# Patient Record
Sex: FEMALE | Race: WHITE | NOT HISPANIC OR LATINO | Employment: FULL TIME | URBAN - METROPOLITAN AREA
[De-identification: names, ages, dates, MRNs, and addresses within clinical notes are randomized per-mention and may not be internally consistent; named-entity substitution may affect disease eponyms.]

---

## 2018-02-05 ENCOUNTER — HOSPITAL ENCOUNTER (OUTPATIENT)
Dept: CT IMAGING | Facility: CLINIC | Age: 58
Discharge: HOME | End: 2018-02-05
Attending: FAMILY MEDICINE | Admitting: FAMILY MEDICINE
Payer: COMMERCIAL

## 2018-02-05 DIAGNOSIS — Z13.6 SCREENING FOR HEART DISEASE: ICD-10-CM

## 2018-02-05 PROCEDURE — 75571 CT HRT W/O DYE W/CA TEST: CPT | Mod: 26 | Performed by: INTERNAL MEDICINE

## 2018-02-05 PROCEDURE — 75571 CT HRT W/O DYE W/CA TEST: CPT

## 2018-02-09 ENCOUNTER — OFFICE VISIT (OUTPATIENT)
Dept: FAMILY MEDICINE | Facility: CLINIC | Age: 58
End: 2018-02-09
Payer: COMMERCIAL

## 2018-02-09 VITALS
DIASTOLIC BLOOD PRESSURE: 84 MMHG | SYSTOLIC BLOOD PRESSURE: 123 MMHG | HEART RATE: 89 BPM | BODY MASS INDEX: 27 KG/M2 | RESPIRATION RATE: 18 BRPM | TEMPERATURE: 96.7 F | HEIGHT: 66 IN | WEIGHT: 168 LBS

## 2018-02-09 DIAGNOSIS — Z00.00 ROUTINE GENERAL MEDICAL EXAMINATION AT A HEALTH CARE FACILITY: Primary | ICD-10-CM

## 2018-02-09 DIAGNOSIS — F40.243 FEAR OF FLYING: ICD-10-CM

## 2018-02-09 DIAGNOSIS — K64.4 EXTERNAL HEMORRHOIDS: ICD-10-CM

## 2018-02-09 LAB
CHOLEST SERPL-MCNC: 245 MG/DL
GLUCOSE SERPL-MCNC: 99 MG/DL (ref 70–99)
HDLC SERPL-MCNC: 122 MG/DL
LDLC SERPL CALC-MCNC: 112 MG/DL
NONHDLC SERPL-MCNC: 123 MG/DL
TRIGL SERPL-MCNC: 57 MG/DL

## 2018-02-09 PROCEDURE — 82947 ASSAY GLUCOSE BLOOD QUANT: CPT | Performed by: FAMILY MEDICINE

## 2018-02-09 PROCEDURE — G0123 SCREEN CERV/VAG THIN LAYER: HCPCS | Performed by: FAMILY MEDICINE

## 2018-02-09 PROCEDURE — 36415 COLL VENOUS BLD VENIPUNCTURE: CPT | Performed by: FAMILY MEDICINE

## 2018-02-09 PROCEDURE — 99386 PREV VISIT NEW AGE 40-64: CPT | Performed by: FAMILY MEDICINE

## 2018-02-09 PROCEDURE — 87624 HPV HI-RISK TYP POOLED RSLT: CPT | Performed by: FAMILY MEDICINE

## 2018-02-09 PROCEDURE — 80061 LIPID PANEL: CPT | Performed by: FAMILY MEDICINE

## 2018-02-09 RX ORDER — ALPRAZOLAM 0.25 MG
0.25 TABLET ORAL 3 TIMES DAILY PRN
Qty: 12 TABLET | Refills: 1 | Status: SHIPPED | OUTPATIENT
Start: 2018-02-09 | End: 2018-03-09

## 2018-02-09 ASSESSMENT — PAIN SCALES - GENERAL: PAINLEVEL: NO PAIN (0)

## 2018-02-09 NOTE — LETTER
[facility]  [address]  [facility] [address]  Phone: [phone]      2/12/2018     Priya Ariella  [address]  [facility] [address]      Dear Priya:    Thank you for allowing me to participate in your care. Your recent test results were reviewed and listed below.      Your cholesterol looks good.  Total number is elevated only because the HDL or good cholesterol is so high.  LDL or bad cholesterol is acceptable at 112.  Triglycerides are very  normal.     Blood sugar is normal at 99.     Your results are provided below for your review  Results for orders placed or performed in visit on 02/09/18   GLUCOSE   Result Value Ref Range    Glucose 99 70 - 99 mg/dL   Lipid panel reflex to direct LDL Fasting   Result Value Ref Range    Cholesterol 245 (H) <200 mg/dL    Triglycerides 57 <150 mg/dL    HDL Cholesterol 122 >49 mg/dL    LDL Cholesterol Calculated 112 (H) <100 mg/dL    Non HDL Cholesterol 123 <130 mg/dL                 Thank you for choosing [facility]. As a result, please continue with the treatment plan discussed in the office. Return as discussed or sooner if symptoms worsen or fail to improve. If you have any further questions or concerns, please do not hesitate to contact us.      Sincerely,        Dr. Manisha Han/[facility]

## 2018-02-09 NOTE — PATIENT INSTRUCTIONS
Thank you for choosing [facility].  You may be receiving a survey in the mail from Lorene Hunter regarding your visit today.  Please take a few minutes to complete and return the survey to let us know how we are doing.      Our Clinic hours are:  Mondays    7:20 am - 7 pm  Tues -  Fri  7:20 am - 5 pm    Clinic Phone: [phone]    The clinic lab opens at 7:30 am Mon - Fri and appointments are required.    [facility]. [phone]  Monday-Thursday 8 am - 7pm  Tues/Wed/Fri 8 am - 5:30 pm           Preventive Health Recommendations  Female Ages 50 - 64    Yearly exam: See your health care provider every year in order to  o Review health changes.   o Discuss preventive care.    o Review your medicines if your doctor has prescribed any.      Get a Pap test every three years (unless you have an abnormal result and your provider advises testing more often).    If you get Pap tests with HPV test, you only need to test every 5 years, unless you have an abnormal result.     You do not need a Pap test if your uterus was removed (hysterectomy) and you have not had cancer.    You should be tested each year for STDs (sexually transmitted diseases) if you're at risk.     Have a mammogram every 1 to 2 years.    Have a colonoscopy at age 50, or have a yearly FIT test (stool test). These exams screen for colon cancer.      Have a cholesterol test every 5 years, or more often if advised.    Have a diabetes test (fasting glucose) every three years. If you are at risk for diabetes, you should have this test more often.     If you are at risk for osteoporosis (brittle bone disease), think about having a bone density scan (DEXA).    Shots: Get a flu shot each year. Get a tetanus shot every 10 years.    Nutrition:     Eat at least 5 servings of fruits and vegetables each day.    Eat whole-grain bread, whole-wheat pasta and brown rice instead of white grains and rice.    Talk to your provider about  Calcium and Vitamin D.     Lifestyle    Exercise at least 150 minutes a week (30 minutes a day, 5 days a week). This will help you control your weight and prevent disease.    Limit alcohol to one drink per day.    No smoking.     Wear sunscreen to prevent skin cancer.     See your dentist every six months for an exam and cleaning.    See your eye doctor every 1 to 2 years.

## 2018-02-09 NOTE — LETTER
February 19, 2018    Priyasergio Krishnan  [address] [facility] [address]    Dear Priya,  We are happy to inform you that your PAP smear result from 2/9/18 is normal.  We are now able to do a follow up test on PAP smears. The DNA test is for HPV (Human Papilloma Virus). Cervical cancer is closely linked with certain types of HPV. Your result showed no evidence of high risk HPV.  Therefore we recommend you return in 5 years for your next pap smear and HPV test.  You will still need to return to the clinic every year for an annual exam and other preventive tests.  Please contact the clinic at [phone] with any questions.  Sincerely,    Manisha Han MD/mary

## 2018-02-09 NOTE — NURSING NOTE
"Chief Complaint   Patient presents with     Physical       Initial /84  Pulse 89  Temp 96.7  F (35.9  C) (Tympanic)  Resp 18  Ht 5' 6\" (1.676 m)  Wt 168 lb (76.2 kg)  Breastfeeding? No  BMI 27.12 kg/m2 Estimated body mass index is 27.12 kg/(m^2) as calculated from the following:    Height as of this encounter: 5' 6\" (1.676 m).    Weight as of this encounter: 168 lb (76.2 kg).  Medication Reconciliation: complete    "

## 2018-02-09 NOTE — Clinical Note
Please abstract the following data from this visit with this patient into the appropriate field in Epic:  Colonoscopy done on this date: 02- (approximately), by this group: [facility], results were normal.

## 2018-02-09 NOTE — MR AVS SNAPSHOT
After Visit Summary   2/9/2018    Priya Krishnan    MRN: [id number]           Patient Information     Date Of Birth          1960        Visit Information        Provider Department      2/9/2018 8:40 AM Manisha Han MD [facility]        Today's Diagnoses     Routine general medical examination at a health care facility    -  1      Care Instructions          Thank you for choosing [facility].  You may be receiving a survey in the mail from [employer] regarding your visit today.  Please take a few minutes to complete and return the survey to let us know how we are doing.      Our Clinic hours are:  Mondays    7:20 am - 7 pm  Tues -  Fri  7:20 am - 5 pm    Clinic Phone: [phone]    The clinic lab opens at 7:30 am Mon - Fri and appointments are required.    [facility]  Ph. [phone]  Monday-Thursday 8 am - 7pm  Tues/Wed/Fri 8 am - 5:30 pm           Preventive Health Recommendations  Female Ages 50 - 64    Yearly exam: See your health care provider every year in order to  o Review health changes.   o Discuss preventive care.    o Review your medicines if your doctor has prescribed any.      Get a Pap test every three years (unless you have an abnormal result and your provider advises testing more often).    If you get Pap tests with HPV test, you only need to test every 5 years, unless you have an abnormal result.     You do not need a Pap test if your uterus was removed (hysterectomy) and you have not had cancer.    You should be tested each year for STDs (sexually transmitted diseases) if you're at risk.     Have a mammogram every 1 to 2 years.    Have a colonoscopy at age 50, or have a yearly FIT test (stool test). These exams screen for colon cancer.      Have a cholesterol test every 5 years, or more often if advised.    Have a diabetes test (fasting glucose) every three years. If you are at risk for diabetes, you should have this test more  "often.     If you are at risk for osteoporosis (brittle bone disease), think about having a bone density scan (DEXA).    Shots: Get a flu shot each year. Get a tetanus shot every 10 years.    Nutrition:     Eat at least 5 servings of fruits and vegetables each day.    Eat whole-grain bread, whole-wheat pasta and brown rice instead of white grains and rice.    Talk to your provider about Calcium and Vitamin D.     Lifestyle    Exercise at least 150 minutes a week (30 minutes a day, 5 days a week). This will help you control your weight and prevent disease.    Limit alcohol to one drink per day.    No smoking.     Wear sunscreen to prevent skin cancer.     See your dentist every six months for an exam and cleaning.    See your eye doctor every 1 to 2 years.            Follow-ups after your visit        Who to contact     If you have questions or need follow up information about today's clinic visit or your schedule please contact [facility] directly at [phone].  Normal or non-critical lab and imaging results will be communicated to you by [employer], letter or phone within 4 business days after the clinic has received the results. If you do not hear from us within 7 days, please contact the clinic through [employer] or phone. If you have a critical or abnormal lab result, we will notify you by phone as soon as possible.  Submit refill requests through [employer]yl[employer] or call your pharmacy and they will forward the refill request to us. Please allow 3 business days for your refill to be completed.          Additional Information About Your Visit        doggyloot Information     doggyloot lets you send messages to your doctor, view your test results, renew your prescriptions, schedule appointments and more. To sign up, go to [email or web address] . Click on \"Log in\" on the left side of the screen, which will take you to the Welcome page. Then click on \"Sign up Now\" on the right side of the page.     You will be " "asked to enter the access code listed below, as well as some personal information. Please follow the directions to create your username and password.     Your access code is: [email or web address]  Expires: 5/10/2018  9:16 AM     Your access code will  in 90 days. If you need help or a new code, please call your [facility] clinic or [phone].        Care EveryWhere ID     This is your Care EveryWhere ID. This could be used by other organizations to access your [facility] medical records  [id number]        Your Vitals Were     Pulse Temperature Respirations Height Breastfeeding? BMI (Body Mass Index)    89 96.7  F (35.9  C) (Tympanic) 18 5' 6\" (1.676 m) No 27.12 kg/m2       Blood Pressure from Last 3 Encounters:   18 123/84    Weight from Last 3 Encounters:   18 168 lb (76.2 kg)              We Performed the Following     GLUCOSE     HPV High Risk Types DNA Cervical     Lipid panel reflex to direct LDL Fasting     Pap imaged thin layer screen with HPV - recommended age 30 - 65        Primary Care Provider Office Phone # Fax #    Manisha Han [phone] [address] [facility] [address]        Equal Access to Services     SAMIR ZEPEDA AH: Hadii aad ku hadasho Soomaali, waaxda luqadaha, qaybta kaalmada adeegyada, jason jaden verito subramanian. So [facility] [phone].    ATENCIÓN: Si habla español, tiene a diaz disposición servicios gratuitos de asistencia lingüística. Llame al [phone].    We comply with applicable federal civil rights laws and [place] laws. We do not discriminate on the basis of race, color, national origin, age, disability, sex, sexual orientation, or gender identity.            Thank you!     Thank you for choosing [facility]  for your care. Our goal is always to provide you with excellent care. Hearing back from our patients is one way we can continue to improve our services. Please take a few minutes to complete the written " survey that you may receive in the mail after your visit with us. Thank you!             Your Updated Medication List - Protect others around you: Learn how to safely use, store and throw away your medicines at www.disposemymeds.org.      Notice  As of 2/9/2018  9:16 AM    You have not been prescribed any medications.

## 2018-02-13 LAB
COPATH REPORT: NORMAL
PAP: NORMAL

## 2018-02-15 LAB
FINAL DIAGNOSIS: NORMAL
HPV HR 12 DNA CVX QL NAA+PROBE: NEGATIVE
HPV16 DNA SPEC QL NAA+PROBE: NEGATIVE
HPV18 DNA SPEC QL NAA+PROBE: NEGATIVE
SPECIMEN DESCRIPTION: NORMAL
SPECIMEN SOURCE CVX/VAG CYTO: NORMAL

## 2018-03-09 ENCOUNTER — TELEPHONE (OUTPATIENT)
Dept: FAMILY MEDICINE | Facility: CLINIC | Age: 58
End: 2018-03-09

## 2018-03-09 DIAGNOSIS — F40.243 FEAR OF FLYING: ICD-10-CM

## 2018-03-09 RX ORDER — ALPRAZOLAM 0.25 MG
0.25 TABLET ORAL 3 TIMES DAILY PRN
Qty: 12 TABLET | Refills: 1 | Status: SHIPPED | OUTPATIENT
Start: 2018-03-09 | End: 2019-08-12

## 2018-03-09 NOTE — TELEPHONE ENCOUNTER
Reason for Call:  Lost medication    Detailed comments: patient is calling stating she got an Rx for Xanax when she was in on 2/9 for her trip coming up this Monday, and she cannot find it. Was hoping to get a new Rx, as her trip is in a couple of days. She uses [facility] in [place].    Phone Number Patient can be reached at: Home number on file [phone] (home)    Best Time: any    Can we leave a detailed message on this number? YES   Deepa Block  Clinic Station  [facility]      Call taken on 3/9/2018 at 8:17 AM by Deepa Block

## 2018-03-09 NOTE — TELEPHONE ENCOUNTER
Rx signed and faxed to [phone] [facility] in [place]. Patient notified.  Deepa Block  Clinic Station  [facility]

## 2018-03-09 NOTE — TELEPHONE ENCOUNTER
[place]  queried - has not filled prescription given in Feb.    Will refill, please fax to pharmacy.    Manisha Han M.D.

## 2018-03-15 ENCOUNTER — TELEPHONE (OUTPATIENT)
Dept: FAMILY MEDICINE | Facility: CLINIC | Age: 58
End: 2018-03-15

## 2018-03-15 NOTE — TELEPHONE ENCOUNTER
3/15/2018    Call Regarding Preventive Health Screening VIP Mammogram    Attempt 2 Clinic made prior attempt(s)      Message on voicemail     Other preventative screenings that are due:       Outreach   BAILEE

## 2018-03-22 NOTE — TELEPHONE ENCOUNTER
3/22/2018    Call Regarding Preventive Health Screening VIP Mammogram    Attempt 3    Message on voicemail     Other preventative screenings that are due:       Outreach   BAILEE

## 2018-06-14 ENCOUNTER — TELEPHONE (OUTPATIENT)
Dept: FAMILY MEDICINE | Facility: CLINIC | Age: 58
End: 2018-06-14

## 2018-06-14 NOTE — TELEPHONE ENCOUNTER
Panel Management Review      Patient has the following on her problem list: None      Composite cancer screening  Chart review shows that this patient is due/due soon for the following Mammogram  Summary:    Patient is due/failing the following:   MAMMOGRAM    Action needed:   Patient needs referral/order: mammogram    Type of outreach:    Phone, spoke to patient.  agreed to mammogram    Questions for provider review:    None                                                                                                                                    Klaudia Raygoza MA       Chart routed to Care Team .

## 2019-01-10 ENCOUNTER — HOSPITAL ENCOUNTER (OUTPATIENT)
Dept: MAMMOGRAPHY | Facility: CLINIC | Age: 59
Discharge: HOME | End: 2019-01-10
Attending: FAMILY MEDICINE | Admitting: FAMILY MEDICINE
Payer: COMMERCIAL

## 2019-01-10 DIAGNOSIS — Z12.31 VISIT FOR SCREENING MAMMOGRAM: ICD-10-CM

## 2019-01-10 PROCEDURE — 77067 SCR MAMMO BI INCL CAD: CPT

## 2019-08-07 ENCOUNTER — TELEPHONE (OUTPATIENT)
Dept: FAMILY MEDICINE | Facility: CLINIC | Age: 59
End: 2019-08-07

## 2019-08-07 DIAGNOSIS — F40.243 FEAR OF FLYING: ICD-10-CM

## 2019-08-07 NOTE — TELEPHONE ENCOUNTER
Requested Prescriptions   Pending Prescriptions Disp Refills     ALPRAZolam (XANAX) 0.25 MG tablet [Pharmacy Med Name: ALPRAZOLAM 0.25MG TABLETS] 12 tablet 0     Sig: TAKE 1 TABLET BY MOUTH THREE TIMES DAILY AS NEEDED FOR ANXIETY       ALPRAZolam (XANAX) 0.25 MG tablet  Last Written Prescription Date:  3/9/18  Last Fill Quantity: 12 tab,   # refills: 1  Last Office Visit: 2/9/18    Manisha Han    Future Office visit:    Next 5 appointments (look out 90 days)    Sep 26, 2019  8:40 AM CDT  PHYSICAL with Manisha Han MD  [facility] [address] [facility] [id number] [phone]           Routing refill request to provider for review/approval because:  Drug not on the [facility], [facility] or [facility] refill protocol or controlled substance      There is no refill protocol information for this order

## 2019-08-07 NOTE — TELEPHONE ENCOUNTER
Last seen 1 year ago. As this is a controlled substance an not for treatment of a life threatening condition, office visit is needed for any prescription.

## 2019-08-07 NOTE — TELEPHONE ENCOUNTER
Routing refill request to provider for review/approval because:  Drug not on the [facility] refill protocol   She uses very sparingly.    Has appt 9-26-19.    Liliana ORDOÑEZ RN

## 2019-08-08 RX ORDER — ALPRAZOLAM 0.25 MG
TABLET ORAL
Qty: 12 TABLET | Refills: 0 | OUTPATIENT
Start: 2019-08-08

## 2019-08-12 ENCOUNTER — OFFICE VISIT (OUTPATIENT)
Dept: FAMILY MEDICINE | Facility: CLINIC | Age: 59
End: 2019-08-12
Payer: COMMERCIAL

## 2019-08-12 VITALS
HEIGHT: 66 IN | RESPIRATION RATE: 16 BRPM | BODY MASS INDEX: 26.29 KG/M2 | HEART RATE: 73 BPM | SYSTOLIC BLOOD PRESSURE: 122 MMHG | WEIGHT: 163.6 LBS | TEMPERATURE: 97.6 F | DIASTOLIC BLOOD PRESSURE: 78 MMHG | OXYGEN SATURATION: 99 %

## 2019-08-12 DIAGNOSIS — F40.243 FEAR OF FLYING: ICD-10-CM

## 2019-08-12 PROBLEM — F40.9 PHOBIA: Status: ACTIVE | Noted: 2019-08-12

## 2019-08-12 PROCEDURE — 99213 OFFICE O/P EST LOW 20 MIN: CPT | Performed by: NURSE PRACTITIONER

## 2019-08-12 RX ORDER — ALPRAZOLAM 0.25 MG
0.25 TABLET ORAL 3 TIMES DAILY PRN
Qty: 24 TABLET | Refills: 0 | Status: SHIPPED | OUTPATIENT
Start: 2019-08-12 | End: 2021-12-13

## 2019-08-12 ASSESSMENT — MIFFLIN-ST. JEOR: SCORE: 1333.83

## 2019-08-12 NOTE — PROGRESS NOTES
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    Her son also moved to [facility] and she is hoping to fly out and visit him often.  She states that she typically makes about 12 flights a year.    Medication Followup of Xanax    Taking Medication as prescribed: yes    Side Effects:  None    Medication Helping Symptoms:  yes       Patient Active Problem List   Diagnosis     Phobia     History reviewed. No pertinent surgical history.    Social History     Tobacco Use     Smoking status: Never Smoker     Smokeless tobacco: Never Used   Substance Use Topics     Alcohol use: Yes     Family History   Problem Relation Age of Onset     Dementia Mother      Cerebrovascular Disease Mother      Heart Disease Father      Asthma Son          Current Outpatient Medications   Medication Sig Dispense Refill     ALPRAZolam (XANAX) 0.25 MG tablet Take 1 tablet (0.25 mg) by mouth 3 times daily as needed for anxiety With flying 24 tablet 0     Allergies   Allergen Reactions     Amoxicillin      blisters       Reviewed and updated as needed this visit by Provider         Review of Systems   ROS COMP: CONSTITUTIONAL: NEGATIVE for fever, chills, change in weight  RESP: NEGATIVE for significant cough or SOB  CV: NEGATIVE for chest pain, palpitations or peripheral edema  PSYCHIATRIC: POSITIVE for anxiety with flying  ROS otherwise negative      Objective    There were no vitals taken for this visit.  There is no height or weight on file to calculate BMI.  Physical Exam   GENERAL: healthy, alert and no distress  RESP: lungs clear to auscultation - no rales, rhonchi or wheezes  CV: regular rate and rhythm, normal S1 S2, no S3 or S4, no murmur, click or rub, no peripheral edema and peripheral pulses strong  PSYCH: mentation appears  normal, affect normal/bright    Diagnostic Test Results:  Labs reviewed in Epic        Assessment & Plan     1. Fear of flying  Refilled Xanax 0.25 mg with flying.  I filled 24 tablets to cover one flight there and back monthly.  Reviewed PDMP and nothing is coming up.  Recommend follow-up in 1 year or sooner if refills needed.  - ALPRAZolam (XANAX) 0.25 MG tablet; Take 1 tablet (0.25 mg) by mouth 3 times daily as needed for anxiety With flying  Dispense: 24 tablet; Refill: 0     See Patient Instructions    Return in about 1 year (around 8/12/2020) for refill on medication.    Padmini Eng NP  [facility]

## 2019-09-26 ENCOUNTER — OFFICE VISIT (OUTPATIENT)
Dept: FAMILY MEDICINE | Facility: CLINIC | Age: 59
End: 2019-09-26
Payer: COMMERCIAL

## 2019-09-26 VITALS
BODY MASS INDEX: 26.2 KG/M2 | DIASTOLIC BLOOD PRESSURE: 76 MMHG | HEIGHT: 66 IN | OXYGEN SATURATION: 99 % | RESPIRATION RATE: 18 BRPM | TEMPERATURE: 98 F | SYSTOLIC BLOOD PRESSURE: 114 MMHG | HEART RATE: 75 BPM | WEIGHT: 163 LBS

## 2019-09-26 DIAGNOSIS — Z00.00 ROUTINE GENERAL MEDICAL EXAMINATION AT A HEALTH CARE FACILITY: Primary | ICD-10-CM

## 2019-09-26 PROCEDURE — 90682 RIV4 VACC RECOMBINANT DNA IM: CPT | Performed by: FAMILY MEDICINE

## 2019-09-26 PROCEDURE — 90471 IMMUNIZATION ADMIN: CPT | Performed by: FAMILY MEDICINE

## 2019-09-26 PROCEDURE — 99396 PREV VISIT EST AGE 40-64: CPT | Mod: 25 | Performed by: FAMILY MEDICINE

## 2019-09-26 ASSESSMENT — PAIN SCALES - GENERAL: PAINLEVEL: NO PAIN (0)

## 2019-09-26 ASSESSMENT — MIFFLIN-ST. JEOR: SCORE: 1331.11

## 2019-09-26 NOTE — PROGRESS NOTES
SUBJECTIVE:   CC: Priya Krishnan is an 59 year old woman who presents for preventive health visit.     Healthy Habits:    Do you get at least three servings of calcium containing foods daily (dairy, green leafy vegetables, etc.)? yes    Amount of exercise or daily activities, outside of work: 4 day(s) per week    Problems taking medications regularly not applicable    Medication side effects: No    Have you had an eye exam in the past two years? yes    Do you see a dentist twice per year? no    Do you have sleep apnea, excessive snoring or daytime drowsiness?no          Today's PHQ-2 Score:   PHQ-2 ( 1999 [employer]izer) 9/26/2019 8/12/2019   Q1: Little interest or pleasure in doing things 0 0   Q2: Feeling down, depressed or hopeless 0 0   PHQ-2 Score 0 0       Abuse: Current or Past(Physical, Sexual or Emotional)- No  Do you feel safe in your environment? Yes    Social History     Tobacco Use     Smoking status: Never Smoker     Smokeless tobacco: Never Used   Substance Use Topics     Alcohol use: Yes     If you drink alcohol do you typically have >3 drinks per day or >7 drinks per week? No                     Reviewed orders with patient.  Reviewed health maintenance and updated orders accordingly - Yes  Labs reviewed in [facility]    Mammogram Screening: Patient over age 50, mutual decision to screen reflected in health maintenance.    Pertinent mammograms are reviewed under the imaging tab.  History of abnormal Pap smear: NO - age 30-65 PAP every 5 years with negative HPV co-testing recommended  PAP / HPV Latest Ref Rng & Units 2/9/2018   PAP - NIL   HPV 16 DNA NEG:Negative Negative   HPV 18 DNA NEG:Negative Negative   OTHER HR HPV NEG:Negative Negative     Reviewed and updated as needed this visit by clinical staff  Tobacco  Allergies  Meds  Med Hx  Surg Hx  Fam Hx  Soc Hx        Reviewed and updated as needed this visit by Provider            ROS:  CONSTITUTIONAL: NEGATIVE for fever, chills, change in  "weight  INTEGUMENTARY/SKIN: NEGATIVE for worrisome rashes, moles or lesions  EYES: NEGATIVE for vision changes or irritation  ENT: NEGATIVE for ear, mouth and throat problems  RESP: NEGATIVE for significant cough or SOB  BREAST: NEGATIVE for masses, tenderness or discharge  CV: NEGATIVE for chest pain, palpitations or peripheral edema  GI: NEGATIVE for nausea, abdominal pain, heartburn, or change in bowel habits  : NEGATIVE for unusual urinary or vaginal symptoms. No vaginal bleeding.  MUSCULOSKELETAL: NEGATIVE for significant arthralgias or myalgia  NEURO: NEGATIVE for weakness, dizziness or paresthesias  PSYCHIATRIC: NEGATIVE for changes in mood or affect     OBJECTIVE:   /76   Pulse 75   Temp 98  F (36.7  C) (Tympanic)   Resp 18   Ht 1.676 m (5' 6\")   Wt 73.9 kg (163 lb)   SpO2 99%   Breastfeeding? No   BMI 26.31 kg/m    EXAM:  GENERAL: healthy, alert and no distress  EYES: Eyes grossly normal to inspection, PERRL and conjunctivae and sclerae normal  HENT: ear canals and TM's normal, nose and mouth without ulcers or lesions  NECK: no adenopathy, no asymmetry, masses, or scars and thyroid normal to palpation  RESP: lungs clear to auscultation - no rales, rhonchi or wheezes  BREAST: normal without masses, tenderness or nipple discharge and no palpable axillary masses or adenopathy  CV: regular rate and rhythm, normal S1 S2, no S3 or S4, no murmur, click or rub, no peripheral edema and peripheral pulses strong  ABDOMEN: soft, nontender, no hepatosplenomegaly, no masses and bowel sounds normal  MS: no gross musculoskeletal defects noted, no edema  SKIN: no suspicious lesions or rashes. Multiple seborrheic keratoses  NEURO: Normal strength and tone, mentation intact and speech normal  PSYCH: mentation appears normal, affect normal/bright    Diagnostic Test Results:  Labs reviewed in Epic    ASSESSMENT/PLAN:   1. Routine general medical examination at a health care facility         COUNSELING: " "  Reviewed preventive health counseling, as reflected in patient instructions       Regular exercise       Healthy diet/nutrition    Estimated body mass index is 26.31 kg/m  as calculated from the following:    Height as of this encounter: 1.676 m (5' 6\").    Weight as of this encounter: 73.9 kg (163 lb).    Weight management plan: Patient referred to endocrine and/or weight management specialty     reports that she has never smoked. She has never used smokeless tobacco.      Counseling Resources:  ATP IV Guidelines  Pooled Cohorts Equation Calculator  Breast Cancer Risk Calculator  FRAX Risk Assessment  ICSI Preventive Guidelines  Dietary Guidelines for Americans, 2010  USDA's MyPlate  ASA Prophylaxis  Lung CA Screening    Manisha Han MD  [facility]  "

## 2019-09-26 NOTE — PATIENT INSTRUCTIONS
The new Shingrix is supposed to be more effective at preventing shingles.  Even if you had Zostavax, this is recommended. I encourage people to check with their pharmacy (or ours) and have them run it through to check the cost.  It's often better covered through your pharmacy benefit.  It is a two part vaccine series - one now and one in 2-6 months.    CBD looks like it helps reduce inflammation in people.  One is CBD product by [employer] that run $40-70 per month (please check website). Second product is Palmatoylethanolamide which is binds to the CBD receptor, this is used in [place].  The product can be found at [employer] and one takes 350 mg twice a day.          Preventive Health Recommendations  Female Ages 50 - 64    Yearly exam: See your health care provider every year in order to  o Review health changes.   o Discuss preventive care.    o Review your medicines if your doctor has prescribed any.      Get a Pap test every three years (unless you have an abnormal result and your provider advises testing more often).    If you get Pap tests with HPV test, you only need to test every 5 years, unless you have an abnormal result.     You do not need a Pap test if your uterus was removed (hysterectomy) and you have not had cancer.    You should be tested each year for STDs (sexually transmitted diseases) if you're at risk.     Have a mammogram every 1 to 2 years.    Have a colonoscopy at age 50, or have a yearly FIT test (stool test). These exams screen for colon cancer.      Have a cholesterol test every 5 years, or more often if advised.    Have a diabetes test (fasting glucose) every three years. If you are at risk for diabetes, you should have this test more often.     If you are at risk for osteoporosis (brittle bone disease), think about having a bone density scan (DEXA).    Shots: Get a flu shot each year. Get a tetanus shot every 10 years.    Nutrition:     Eat at least 5 servings of fruits and  vegetables each day.    Eat whole-grain bread, whole-wheat pasta and brown rice instead of white grains and rice.    Get adequate Calcium and Vitamin D.     Lifestyle    Exercise at least 150 minutes a week (30 minutes a day, 5 days a week). This will help you control your weight and prevent disease.    Limit alcohol to one drink per day.    No smoking.     Wear sunscreen to prevent skin cancer.     See your dentist every six months for an exam and cleaning.    See your eye doctor every 1 to 2 years.

## 2020-08-11 ENCOUNTER — MYC MEDICAL ADVICE (OUTPATIENT)
Dept: FAMILY MEDICINE | Facility: CLINIC | Age: 60
End: 2020-08-11

## 2020-08-11 NOTE — TELEPHONE ENCOUNTER
Placed call to MD to clarify when pt can have Shingrex shot s/p Shingles.  Have received differing time frames from staff.  Pt is aware she will be called back with date info.  Mukund     Spoke with [facility] staff and pt can have Shingrex inj after acute symptoms have all resolved. Skin is clear and no symptoms.     LM to call clinic/RN concerning Shingrex appt. Mukund

## 2020-08-12 NOTE — TELEPHONE ENCOUNTER
Pt had bout of shingles 3 months ago.  Is all healed and no symptoms.  Pt will call Pharmacy to check on cost with her Ins.  Will call back if chooses to schedule with CMA in the clinic.  Juan

## 2020-10-20 ENCOUNTER — VIRTUAL VISIT (OUTPATIENT)
Dept: FAMILY MEDICINE | Facility: CLINIC | Age: 60
End: 2020-10-20
Payer: COMMERCIAL

## 2020-10-20 DIAGNOSIS — J02.9 SORE THROAT: ICD-10-CM

## 2020-10-20 DIAGNOSIS — Z20.822 SUSPECTED COVID-19 VIRUS INFECTION: Primary | ICD-10-CM

## 2020-10-20 PROCEDURE — 99213 OFFICE O/P EST LOW 20 MIN: CPT | Mod: 95 | Performed by: NURSE PRACTITIONER

## 2020-10-20 ASSESSMENT — ENCOUNTER SYMPTOMS
APPETITE CHANGE: 1
CHILLS: 1
DIARRHEA: 0
WHEEZING: 0
SINUS PRESSURE: 0
MYALGIAS: 0
CARDIOVASCULAR NEGATIVE: 1
VOMITING: 0
FATIGUE: 0
HEADACHES: 1
RHINORRHEA: 0
COUGH: 1
ABDOMINAL PAIN: 0
NAUSEA: 0
SORE THROAT: 1
FEVER: 0
SHORTNESS OF BREATH: 0

## 2020-10-20 NOTE — PROGRESS NOTES
"Priya Krishnan is a 60 year old female who is being evaluated via a billable telephone visit.      The patient has been notified of following:     \"This telephone visit will be conducted via a call between you and your physician/provider. We have found that certain health care needs can be provided without the need for a physical exam.  This service lets us provide the care you need with a short phone conversation.  If a prescription is necessary we can send it directly to your pharmacy.  If lab work is needed we can place an order for that and you can then stop by our lab to have the test done at a later time.    Telephone visits are billed at different rates depending on your insurance coverage. During this emergency period, for some insurers they may be billed the same as an in-person visit.  Please reach out to your insurance provider with any questions.    If during the course of the call the physician/provider feels a telephone visit is not appropriate, you will not be charged for this service.\"    Patient has given verbal consent for Telephone visit?  Yes    What phone number would you like to be contacted at? [phone]    How would you like to obtain your AVS? Mail a copy    Subjective     Priya Krishnan is a 60 year old female who presents via phone visit today for the following health issues:    HPI     Acute Illness  Acute illness concerns: Sore throat and chills  Onset/Duration: 3 to 4 days  Symptoms:  Fever: no  Chills/Sweats: YES  Headache (location?): YES  Sinus Pressure: no  Conjunctivitis:  no  Ear Pain: YES: both  Rhinorrhea: no  Congestion: no  Sore Throat: YES  Cough: YES- dry cough - not prodocutive  Wheeze: no  Decreased Appetite: YES- somewhat  Nausea: no  Vomiting: no  Diarrhea: no  Dysuria/Freq.: no  Dysuria or Hematuria: no  Loss of taste or smell: No  Fatigue/Achiness: no  Sick/Strep Exposure: YES- baby granddaughter had a stuffy cold - saw her 3 days ago  Therapies tried and outcome: " advil      Additional provider notes: 3-4 days of cold like symptoms with sore throat, cough, chills symptoms worsening. Drainage and sore throat is worse at night. Saw granddaughter 3 days ago and she had cold symptoms. She is teething. She goes to . No COVID exposure. Works from home.     Review of Systems   Constitutional: Positive for appetite change (decreased) and chills (and sweats). Negative for fatigue and fever.   HENT: Positive for postnasal drip and sore throat. Negative for congestion, ear pain, rhinorrhea and sinus pressure.    Respiratory: Positive for cough. Negative for shortness of breath and wheezing.    Cardiovascular: Negative.    Gastrointestinal: Negative for abdominal pain, diarrhea, nausea and vomiting.   Musculoskeletal: Negative for myalgias.   Skin: Negative for rash.   Neurological: Positive for headaches.             Objective          Vitals:  No vitals were obtained today due to virtual visit.    healthy, alert, no distress and cooperative  PSYCH: Alert and oriented times 3; coherent speech, normal   rate and volume, able to articulate logical thoughts, able   to abstract reason, no tangential thoughts, no hallucinations   or delusions  Her affect is normal and pleasant  RESP: No cough, no audible wheezing, able to talk in full sentences  Remainder of exam unable to be completed due to telephone visits            Assessment/Plan:    Assessment & Plan     Suspected COVID-19 virus infection  Symptoms consistent with [facility] testing guidelines. Discussed quarantining recommendations. Discussed process for scheduling COVID testing. Recommended ER visit if symptoms worsen and/or can't be managed at home.     - Symptomatic COVID-19 Virus (Coronavirus) by PCR; Future    Sore throat  Strep testing ordered as well. Instructed patient to remind  who calls for COVID testing that she also needs strep testing.     - Streptococcus A Rapid Scr w Reflx to PCR; Future        Patient  Instructions   COVID testing ordered today. You will receive a call to schedule that appointment.     *When scheduling calls to schedule COVID testing, please remind them that you also need strep testing done as well.     Please quarantine until you receive your results.     If the results are negative, you need to be symptom free for 24 hours before ending quarantine.     If results are positive, you need to quarantine for 10 days from start of symptoms AND you need to be fever free (without the use of fever reducing medications) for 24 hours before ending quarantine.     If results are positive, asymptomatic household contacts will need to quarantine for 14 days.     If you develop worsening symptoms or difficulty breathing, please go to ER.        Return if symptoms worsen or fail to improve.    HILARY [facility]    Phone call duration:  10 minutes

## 2020-10-20 NOTE — PATIENT INSTRUCTIONS
COVID testing ordered today. You will receive a call to schedule that appointment.     *When scheduling calls to schedule COVID testing, please remind them that you also need strep testing done as well.     Please quarantine until you receive your results.     If the results are negative, you need to be symptom free for 24 hours before ending quarantine.     If results are positive, you need to quarantine for 10 days from start of symptoms AND you need to be fever free (without the use of fever reducing medications) for 24 hours before ending quarantine.     If results are positive, asymptomatic household contacts will need to quarantine for 14 days.     If you develop worsening symptoms or difficulty breathing, please go to ER.

## 2020-10-21 ENCOUNTER — MYC MEDICAL ADVICE (OUTPATIENT)
Dept: FAMILY MEDICINE | Facility: CLINIC | Age: 60
End: 2020-10-21

## 2020-10-21 ENCOUNTER — AMBULATORY - HEALTHEAST (OUTPATIENT)
Dept: FAMILY MEDICINE | Facility: CLINIC | Age: 60
End: 2020-10-21

## 2020-10-21 DIAGNOSIS — Z20.822 SUSPECTED COVID-19 VIRUS INFECTION: ICD-10-CM

## 2020-10-21 DIAGNOSIS — J02.9 SORE THROAT: ICD-10-CM

## 2020-10-23 ENCOUNTER — AMBULATORY - HEALTHEAST (OUTPATIENT)
Dept: FAMILY MEDICINE | Facility: CLINIC | Age: 60
End: 2020-10-23

## 2020-10-23 DIAGNOSIS — Z20.822 SUSPECTED COVID-19 VIRUS INFECTION: ICD-10-CM

## 2020-10-23 DIAGNOSIS — J02.9 SORE THROAT: ICD-10-CM

## 2020-10-23 LAB — DEPRECATED S PYO AG THROAT QL EIA: NORMAL

## 2020-10-24 LAB — GROUP A STREP BY PCR: NORMAL

## 2020-10-25 ENCOUNTER — COMMUNICATION - HEALTHEAST (OUTPATIENT)
Dept: SCHEDULING | Facility: CLINIC | Age: 60
End: 2020-10-25

## 2020-10-27 ENCOUNTER — MYC MEDICAL ADVICE (OUTPATIENT)
Dept: FAMILY MEDICINE | Facility: CLINIC | Age: 60
End: 2020-10-27

## 2020-10-27 ENCOUNTER — COMMUNICATION - HEALTHEAST (OUTPATIENT)
Dept: FAMILY MEDICINE | Facility: CLINIC | Age: 60
End: 2020-10-27

## 2020-11-02 ENCOUNTER — VIRTUAL VISIT (OUTPATIENT)
Dept: FAMILY MEDICINE | Facility: CLINIC | Age: 60
End: 2020-11-02
Payer: COMMERCIAL

## 2020-11-02 DIAGNOSIS — J40 BRONCHITIS: Primary | ICD-10-CM

## 2020-11-02 PROCEDURE — 99214 OFFICE O/P EST MOD 30 MIN: CPT | Mod: 95 | Performed by: NURSE PRACTITIONER

## 2020-11-02 RX ORDER — PREDNISONE 20 MG/1
20 TABLET ORAL DAILY
Qty: 5 TABLET | Refills: 0 | Status: SHIPPED | OUTPATIENT
Start: 2020-11-02 | End: 2021-05-03

## 2020-11-02 NOTE — PROGRESS NOTES
"Priya Krishnan is a 60 year old female who is being evaluated via a billable video visit.      The patient has been notified of following:     \"This video visit will be conducted via a call between you and your physician/provider. We have found that certain health care needs can be provided without the need for an in-person physical exam.  This service lets us provide the care you need with a video conversation.  If a prescription is necessary we can send it directly to your pharmacy.  If lab work is needed we can place an order for that and you can then stop by our lab to have the test done at a later time.    Video visits are billed at different rates depending on your insurance coverage.  Please reach out to your insurance provider with any questions.    If during the course of the call the physician/provider feels a video visit is not appropriate, you will not be charged for this service.\"    Patient has given verbal consent for Video visit? Yes  How would you like to obtain your AVS? MyChart  If you are dropped from the video visit, the video invite should be resent to: Send to e-mail at: [email or web address]  Will anyone else be joining your video visit? No    Subjective     Priya Krishnan is a 60 year old female who presents today via video visit for the following health issues:    HPI     Acute Illness  Acute illness concerns: still having chest cough and wheezing.  Onset/Duration: since Oct 15, negative COVID and rapid strep on the 23rd. Got [facility] Fri 10/30/2020 [facility].   Symptoms: patient feels like symptoms continue to change- symptoms started with sore throat, dry cough- had negative covid. Cough became productive, sore throat resolved, having increase in wheezing, productive coughing. No fever, + chills. Robutussin only allows her to sleep for 2 hrs then wakes with wheezing. She tried her husbands albuterol which helped. No history of asthma, COPD or smoking.   Fever: no  Chills/Sweats: " YES  Headache (location?): YES- temple  Sinus Pressure: YES  Conjunctivitis:  no  Ear Pain: stuffy   Rhinorrhea: no  Congestion: no  Sore Throat: no  Cough: YES-productive of yellow sputum, barking, waxing and waning over time-worse at night.  Wheeze: YES  Decreased Appetite: no  Nausea: no  Vomiting: no  Diarrhea: no  Dysuria/Freq.: no  Dysuria or Hematuria: no  Fatigue/Achiness: YES-fatigue  Sick/Strep Exposure: no  Therapies tried and outcome: z-pac, 's albuterol inhaler, Robitussin AC          Review of Systems   Constitutional, HEENT, cardiovascular, pulmonary, GI, , musculoskeletal, neuro, skin, endocrine and psych systems are negative, except as otherwise noted.      Objective           Vitals:  No vitals were obtained today due to virtual visit.    Physical Exam     GENERAL: Healthy, alert and no distress  EYES: Eyes grossly normal to inspection.  No discharge or erythema, or obvious scleral/conjunctival abnormalities.  RESP: No audible wheeze, cough, or visible cyanosis.  No visible retractions or increased work of breathing.    SKIN: Visible skin clear. No significant rash, abnormal pigmentation or lesions.  NEURO: Cranial nerves grossly intact.  Mentation and speech appropriate for age.  PSYCH: Mentation appears normal, affect normal/bright, judgement and insight intact, normal speech and appearance well-groomed.      ASSESSMENT / PLAN:  (J40) Bronchitis  (primary encounter diagnosis)  Comment:   Plan: start predniSONE (DELTASONE) 20 MG tablet        Continue /complete antibiotics of Azithromycin  If no improvement will obtain chest x-ray     Telephone call was 9 minutes    [facility]  [phone]

## 2020-11-13 ENCOUNTER — MYC MEDICAL ADVICE (OUTPATIENT)
Dept: FAMILY MEDICINE | Facility: CLINIC | Age: 60
End: 2020-11-13

## 2020-11-13 ENCOUNTER — ANCILLARY PROCEDURE (OUTPATIENT)
Dept: GENERAL RADIOLOGY | Facility: CLINIC | Age: 60
End: 2020-11-13
Attending: NURSE PRACTITIONER
Payer: COMMERCIAL

## 2020-11-13 ENCOUNTER — OFFICE VISIT (OUTPATIENT)
Dept: FAMILY MEDICINE | Facility: CLINIC | Age: 60
End: 2020-11-13
Payer: COMMERCIAL

## 2020-11-13 VITALS
HEIGHT: 65 IN | OXYGEN SATURATION: 98 % | BODY MASS INDEX: 26.34 KG/M2 | TEMPERATURE: 98 F | WEIGHT: 158.1 LBS | DIASTOLIC BLOOD PRESSURE: 80 MMHG | HEART RATE: 84 BPM | SYSTOLIC BLOOD PRESSURE: 124 MMHG

## 2020-11-13 DIAGNOSIS — R05.9 COUGH: ICD-10-CM

## 2020-11-13 DIAGNOSIS — R06.2 WHEEZING: Primary | ICD-10-CM

## 2020-11-13 PROCEDURE — 99213 OFFICE O/P EST LOW 20 MIN: CPT | Performed by: NURSE PRACTITIONER

## 2020-11-13 PROCEDURE — 71046 X-RAY EXAM CHEST 2 VIEWS: CPT | Performed by: RADIOLOGY

## 2020-11-13 RX ORDER — PREDNISONE 20 MG/1
40 TABLET ORAL DAILY
Qty: 14 TABLET | Refills: 0 | Status: SHIPPED | OUTPATIENT
Start: 2020-11-13 | End: 2020-11-20

## 2020-11-13 RX ORDER — ALBUTEROL SULFATE 90 UG/1
2 AEROSOL, METERED RESPIRATORY (INHALATION) EVERY 4 HOURS PRN
Qty: 1 INHALER | Refills: 0 | Status: SHIPPED | OUTPATIENT
Start: 2020-11-13 | End: 2021-07-23

## 2020-11-13 ASSESSMENT — ENCOUNTER SYMPTOMS
COUGH: 1
WEAKNESS: 0
ACTIVITY CHANGE: 0
SHORTNESS OF BREATH: 0
CHEST TIGHTNESS: 1
DIAPHORESIS: 0
SORE THROAT: 0
DIZZINESS: 0
MYALGIAS: 0
CHILLS: 0
LIGHT-HEADEDNESS: 0
WHEEZING: 1

## 2020-11-13 ASSESSMENT — MIFFLIN-ST. JEOR: SCORE: 1281.76

## 2020-11-13 NOTE — PROGRESS NOTES
"Subjective     Priya Krishnan is a 60 year old female who presents to clinic today for the following health issues:    Acute Bronchitis Follow up    Patient returns to clinic following 5 day course of steroids and ZPack for acute bronchitis. She states overall, she feels better but she can still feel wheezing and popping in the left lower lungs. She continues to complain of slight shortness of breath and chest tightness. Overall, she states these symptoms have improved since treatment with oral steroids and Zpack. Her energy level has improved. But she continues to feel fatigued. She denies fever, chills, night seats.         Review of Systems   Constitutional: Negative for activity change, chills and diaphoresis.   HENT: Negative for congestion and sore throat.    Respiratory: Positive for cough, chest tightness and wheezing. Negative for shortness of breath.    Musculoskeletal: Negative for myalgias.   Neurological: Negative for dizziness, weakness and light-headedness.   ROS otherwise negative except as noted in HPI      Objective    /80 (BP Location: Right arm, Patient Position: Sitting, Cuff Size: Adult Regular)   Pulse 84   Temp 98  F (36.7  C) (Tympanic)   Ht 1.641 m (5' 4.61\")   Wt 71.7 kg (158 lb 1.6 oz)   SpO2 98%   BMI 26.63 kg/m    Body mass index is 26.63 kg/m .  Physical Exam   GENERAL: healthy, alert and no distress  RESP: expiratory wheezes R upper posterior, R mid posterior, L mid anterior, L mid posterior and L lower posterior and inspiratory wheezes L mid posterior and L lower posterior  CV: regular rate and rhythm, normal S1 S2, no S3 or S4, no murmur, click or rub, no peripheral edema and peripheral pulses strong  PSYCH: mentation appears normal, affect normal/bright    CXR - Normal- no infiltrates, effusions, pneumothoraces, cardiomegaly or masses  see report from Radiologist        Assessment & Plan   Problem List Items Addressed This Visit     None      Visit Diagnoses     " "Wheezing    -  Primary    Relevant Medications    predniSONE (DELTASONE) 20 MG tablet    albuterol (PROAIR HFA/PROVENTIL HFA/VENTOLIN HFA) 108 (90 Base) MCG/ACT inhaler    Other Relevant Orders    XR Chest 2 Views         BMI:   Estimated body mass index is 26.63 kg/m  as calculated from the following:    Height as of this encounter: 1.641 m (5' 4.61\").    Weight as of this encounter: 71.7 kg (158 lb 1.6 oz).      Patient continues to have diffuse wheeze bilaterally, although she reports improvement in cough, and fatigue. Findings consistent with acute bronchitis. Chest xray is normal ruling out a pneumonia.Will treat with a 7 day course of corticosteroids and albuterol inhaler.     Patient Instructions   Prednisone Discharge Instructions:  Please take the steroid, Prednisone, for the full course as prescribed.  Take Prednisone with food or milk to minimize stomach upset.      Side effects of Prednisone include difficulty sleeping, increased appetite, weight gain, and changes in mood.  If you are diabetic, please monitor your blood sugar regularly while taking this medicine as Prednisone can cause high blood sugar.      Return in about 1 week (around 11/20/2020) for worsening or continued symptoms.    Teresa Clemens, Student [facility]    "

## 2020-11-13 NOTE — TELEPHONE ENCOUNTER
Left message for the patient to call back. CSS, please place patient in at 3 if calls back by then with Agustín.      HARI Wylie

## 2020-11-13 NOTE — PROGRESS NOTES
"Subjective     Priya Krishnan is a 60 year old female who presents to clinic today for the following health issues:  Chief Complaint   Patient presents with     URI     x 4 weeks, chest cold        HPI         Acute Illness  Acute illness concerns: URI, chest cold   Onset/Duration: x 4 weeks   Symptoms:  Fever: no, nothing recent.  2 weeks ago about 100.0  Chills/Sweats: no  Headache (location?): YES  Sinus Pressure: no  Conjunctivitis:  no  Ear Pain: no, plugged a bit x 1 month  Rhinorrhea: no  Congestion: no  Sore Throat: YES, started w/ severe sore throat.   Cough: YES-productive of yellow sputum  Wheeze: YES- on left side, has improved a bit.  Steroids seemed to have helped a lot  Decreased Appetite: YES  Nausea: no  Vomiting: no  Diarrhea: no  Dysuria/Freq.: no  Dysuria or Hematuria: no  Fatigue/Achiness: YES  Sick/Strep Exposure: no  Therapies tried and outcome: has been on Zpac and a round of steroids.  Has had a negative COVID and negative rapid strep     Above HPI reviewed. Additionally, feels somewhat improved. No fever. No shortness of breath. Not currently using an inhaler.      Review of Systems   Constitutional, HEENT, cardiovascular, pulmonary, gi and gu systems are negative, except as otherwise noted.      Objective    /80 (BP Location: Right arm, Patient Position: Sitting, Cuff Size: Adult Regular)   Pulse 84   Temp 98  F (36.7  C) (Tympanic)   Ht 1.641 m (5' 4.61\")   Wt 71.7 kg (158 lb 1.6 oz)   SpO2 98%   BMI 26.63 kg/m    Body mass index is 26.63 kg/m .  Physical Exam  Vitals signs and nursing note reviewed.   Constitutional:       Appearance: Normal appearance.   HENT:      Head: Normocephalic and atraumatic.      Right Ear: Tympanic membrane and ear canal normal.      Left Ear: Tympanic membrane and ear canal normal.      Nose: Nose normal. No rhinorrhea.      Right Sinus: No maxillary sinus tenderness or frontal sinus tenderness.      Left Sinus: No maxillary sinus tenderness or " "frontal sinus tenderness.      Mouth/Throat:      Lips: Pink.      Mouth: Mucous membranes are moist.      Pharynx: Oropharynx is clear.   Eyes:      General: Lids are normal.      Conjunctiva/sclera: Conjunctivae normal.      Comments: Non icteric   Neck:      Musculoskeletal: Neck supple.   Cardiovascular:      Rate and Rhythm: Normal rate and regular rhythm.      Pulses: Normal pulses.      Heart sounds: Normal heart sounds, S1 normal and S2 normal.   Pulmonary:      Effort: Pulmonary effort is normal.      Breath sounds: Normal air entry. Wheezing (bilateral scattered expiratory wheezes) present.   Lymphadenopathy:      Cervical: No cervical adenopathy.   Skin:     General: Skin is warm and dry.   Neurological:      General: No focal deficit present.      Mental Status: She is alert and oriented to person, place, and time.   Psychiatric:         Mood and Affect: Mood normal.         Behavior: Behavior normal.         Thought Content: Thought content normal.         Judgment: Judgment normal.            CXR - Normal- no infiltrates, effusions, pneumothoraces, cardiomegaly or masses. Pending radiology interpretation.          Assessment & Plan     Wheezing  Likely post viral cough syndrome, however she does continue to have a mild wheeze. Will repeat course of steroids. Can use albuterol as needed. RTC if not improving.  - XR Chest 2 Views; Future  - predniSONE (DELTASONE) 20 MG tablet; Take 2 tablets (40 mg) by mouth daily for 7 days  - albuterol (PROAIR HFA/PROVENTIL HFA/VENTOLIN HFA) 108 (90 Base) MCG/ACT inhaler; Inhale 2 puffs into the lungs every 4 hours as needed for wheezing     BMI:   Estimated body mass index is 26.63 kg/m  as calculated from the following:    Height as of this encounter: 1.641 m (5' 4.61\").    Weight as of this encounter: 71.7 kg (158 lb 1.6 oz).            Patient Instructions   Prednisone Discharge Instructions:  Please take the steroid, Prednisone, for the full course as prescribed.  " Take Prednisone with food or milk to minimize stomach upset.      Side effects of Prednisone include difficulty sleeping, increased appetite, weight gain, and changes in mood.  If you are diabetic, please monitor your blood sugar regularly while taking this medicine as Prednisone can cause high blood sugar.        Return in about 1 week (around 11/20/2020) for worsening or continued symptoms.    HILARY [facility]

## 2020-11-13 NOTE — PATIENT INSTRUCTIONS
Prednisone Discharge Instructions:  Please take the steroid, Prednisone, for the full course as prescribed.  Take Prednisone with food or milk to minimize stomach upset.      Side effects of Prednisone include difficulty sleeping, increased appetite, weight gain, and changes in mood.  If you are diabetic, please monitor your blood sugar regularly while taking this medicine as Prednisone can cause high blood sugar.

## 2020-11-13 NOTE — TELEPHONE ENCOUNTER
Would you possibly work patient in today? No appointments in [place] or [facility] or [place], send to Urgent Care?    HARI Wylie

## 2020-11-13 NOTE — TELEPHONE ENCOUNTER
Routing to Out of office provider pool for review, per virtual visit on 11-2-2020:        Continue /complete antibiotics of Azithromycin  If no improvement will obtain chest x-ray      Telephone call was 9 minutes     [facility]  [phone]      Please advise if xray is needed, patient my charts some wheezing on the left side and cough.    HARI Wylie

## 2021-01-03 ENCOUNTER — HEALTH MAINTENANCE LETTER (OUTPATIENT)
Age: 61
End: 2021-01-03

## 2021-01-27 ENCOUNTER — MYC MEDICAL ADVICE (OUTPATIENT)
Dept: FAMILY MEDICINE | Facility: CLINIC | Age: 61
End: 2021-01-27

## 2021-01-27 ENCOUNTER — TRANSFERRED RECORDS (OUTPATIENT)
Dept: HEALTH INFORMATION MANAGEMENT | Facility: CLINIC | Age: 61
End: 2021-01-27

## 2021-02-08 ENCOUNTER — ANCILLARY PROCEDURE (OUTPATIENT)
Dept: MAMMOGRAPHY | Facility: CLINIC | Age: 61
End: 2021-02-08
Attending: FAMILY MEDICINE
Payer: COMMERCIAL

## 2021-02-08 DIAGNOSIS — Z12.31 VISIT FOR SCREENING MAMMOGRAM: ICD-10-CM

## 2021-02-08 PROCEDURE — 77067 SCR MAMMO BI INCL CAD: CPT | Mod: TC | Performed by: RADIOLOGY

## 2021-02-08 PROCEDURE — 77063 BREAST TOMOSYNTHESIS BI: CPT | Mod: TC | Performed by: RADIOLOGY

## 2021-03-31 ENCOUNTER — MYC MEDICAL ADVICE (OUTPATIENT)
Dept: FAMILY MEDICINE | Facility: CLINIC | Age: 61
End: 2021-03-31

## 2021-03-31 NOTE — TELEPHONE ENCOUNTER
Spoke with pt and she will check with [facility], [facility] and last resort do [email or web address] or virtual appt.  Pt is in quarantine @ this time.  Mukund

## 2021-03-31 NOTE — TELEPHONE ENCOUNTER
Yes, probably should be tested to r/o COVID.  Can do that through [place] testing site or with a virtual visit with [email or web address] or [place] e-visit.    Manisha Han M.D.

## 2021-04-11 ENCOUNTER — TRANSFERRED RECORDS (OUTPATIENT)
Dept: HEALTH INFORMATION MANAGEMENT | Facility: CLINIC | Age: 61
End: 2021-04-11

## 2021-05-03 ENCOUNTER — TELEPHONE (OUTPATIENT)
Dept: FAMILY MEDICINE | Facility: CLINIC | Age: 61
End: 2021-05-03

## 2021-05-03 ENCOUNTER — VIRTUAL VISIT (OUTPATIENT)
Dept: FAMILY MEDICINE | Facility: CLINIC | Age: 61
End: 2021-05-03
Payer: COMMERCIAL

## 2021-05-03 DIAGNOSIS — J01.90 ACUTE SINUSITIS WITH SYMPTOMS > 10 DAYS: Primary | ICD-10-CM

## 2021-05-03 PROCEDURE — 99213 OFFICE O/P EST LOW 20 MIN: CPT | Mod: 95 | Performed by: NURSE PRACTITIONER

## 2021-05-03 RX ORDER — DOXYCYCLINE 100 MG/1
100 CAPSULE ORAL 2 TIMES DAILY
Qty: 20 CAPSULE | Refills: 0 | Status: SHIPPED | OUTPATIENT
Start: 2021-05-03 | End: 2021-05-13

## 2021-05-03 NOTE — PATIENT INSTRUCTIONS
Patient Education     Acute Bacterial Rhinosinusitis (ABRS)    Acute bacterial rhinosinusitis (ABRS) is an infection of your nasal cavity and sinuses. It s caused by bacteria. Acute means that you ve had symptoms for less than 4 weeks, but possibly up to 12 weeks.  Understanding your sinuses  The nasal cavity is the large air-filled space behind your nose. The sinuses are a group of spaces formed by the bones of your face. They connect with your nasal cavity. ABRS causes the tissue lining these spaces to become inflamed. Mucus may not drain normally. This leads to facial pain and other symptoms.  What causes ABRS?  ABRS most often follows an upper respiratory infection caused by a virus. Bacteria then infect the lining of your nasal cavity and sinuses. But you can also get ABRS if you have:    Nasal allergies    Long-term nasal swelling and congestion not caused by allergies    Blockage in the nose  Symptoms of ABRS  The symptoms of ABRS may be different for each person and include:    Nasal congestion or blockage    Pain or pressure in the face    Thick, colored drainage from the nose  Other symptoms may include:    Runny nose    Fluid draining from the nose down the throat (postnasal drip)    Headache    Cough    Pain    Fever  Diagnosing ABRS  ABRS may be diagnosed if you ve had an upper respiratory infection like a cold and cough for 10 or more days without improvement or with worsening symptoms. Your healthcare provider will ask about your symptoms and your medical history. The provider will check your vital signs, including your temperature. You ll have a physical exam. The healthcare provider will check your ears, nose, and throat. You likely won t need any tests. If ABRS comes back, you may have a culture or other tests.  Treatment for ABRS  Treatment may include:    Antibiotic medicine. This is for symptoms that last for at least 10 to 14 days.    Nasal corticosteroid medicine. Drops or spray used in the  nose can lessen swelling and congestion.    Over-the-counter pain medicine. This is to lessen sinus pain and pressure.    Nasal decongestant medicine. Spray or drops may help to lessen congestion. Do not use them for more than a few days.    Salt wash (saline irrigation). This can help to loosen mucus.  Possible complications of ABRS  ABRS may come back or become long-term (chronic). In rare cases, ABRS may cause complications such as:     Inflamed tissue around the brain and spinal cord (meningitis)    Inflamed tissue around the eyes (orbital cellulitis)    Inflamed bones around the sinuses (osteitis)  These problems may need to be treated in a hospital with intravenous (IV) antibiotic medicine or surgery.  When to call the healthcare provider  Call your healthcare provider if you have any of the following:    Symptoms that don t get better, or get worse    Symptoms that don t get better after 3 to 5 days on antibiotics    Trouble seeing    Swelling around your eyes    Confusion or trouble staying awake   [employer] last reviewed this educational content on 5/1/2017 2000-2021 [employer]. All rights reserved. This information is not intended as a substitute for professional medical care. Always follow your healthcare professional's instructions.           Patient Education     Self-Care for Sinusitis  Sinusitis can often be managed with self-care. Self-care can keep sinuses moist and make you feel more comfortable. Remember to follow your doctor's instructions closely. This can make a big difference in getting your sinus problem under control.   Drink fluids  Drinking extra fluids helps thin your mucus. This lets it drain from your sinuses more easily. Have a glass of water every hour or two. A humidifier helps in much the same way. Fluids can also offset the drying effects of certain medicines. If you use a humidifier, follow the product maker's instructions on how to use it. Clean it on a regular  schedule.      Drinking plenty of water can help sinuses drain.   Use saltwater rinses  Rinses help keep your sinuses and nose moist. Mix a teaspoon of salt in 8 ounces of fresh, warm water. Use a bulb syringe to gently squirt the water into your nose a few times a day. You can also buy ready-made saline nasal sprays.   Apply hot or cold packs  Applying heat to the area surrounding your sinuses may make you feel more comfortable. Use a hot water bottle or a hand towel dipped in hot water. Some people also find ice packs effective for relieving pain.   Medicines  Your doctor may prescribe medicines to help treat your sinusitis. If you have an infection, antibiotics can help clear it up. If you are prescribed antibiotics, take all pills on schedule until they are gone, even if you feel better. Decongestants help relieve swelling. Use decongestant sprays for short periods only under the direction of your healthcare provider. If you have allergies, your doctor may prescribe medicines to help relieve them.   [employer] last reviewed this educational content on 9/1/2019 2000-2021 [employer]. All rights reserved. This information is not intended as a substitute for professional medical care. Always follow your healthcare professional's instructions.

## 2021-05-03 NOTE — PROGRESS NOTES
"Priya is a 60 year old who is being evaluated via a billable telephone visit.      What phone number would you like to be contacted at? [phone]  How would you like to obtain your AVS? MyChart    Assessment & Plan     Acute sinusitis with symptoms > 10 days    - doxycycline hyclate (VIBRAMYCIN) 100 MG capsule; Take 1 capsule (100 mg) by mouth 2 times daily for 10 days             BMI:   Estimated body mass index is 26.63 kg/m  as calculated from the following:    Height as of 11/13/20: 1.641 m (5' 4.61\").    Weight as of 11/13/20: 71.7 kg (158 lb 1.6 oz).       FUTURE APPOINTMENTS:       - Follow up in 1 week for persistent symptoms, sooner for new or worsening symptoms.     Patient Instructions       Patient Education     Acute Bacterial Rhinosinusitis (ABRS)    Acute bacterial rhinosinusitis (ABRS) is an infection of your nasal cavity and sinuses. It s caused by bacteria. Acute means that you ve had symptoms for less than 4 weeks, but possibly up to 12 weeks.  Understanding your sinuses  The nasal cavity is the large air-filled space behind your nose. The sinuses are a group of spaces formed by the bones of your face. They connect with your nasal cavity. ABRS causes the tissue lining these spaces to become inflamed. Mucus may not drain normally. This leads to facial pain and other symptoms.  What causes ABRS?  ABRS most often follows an upper respiratory infection caused by a virus. Bacteria then infect the lining of your nasal cavity and sinuses. But you can also get ABRS if you have:    Nasal allergies    Long-term nasal swelling and congestion not caused by allergies    Blockage in the nose  Symptoms of ABRS  The symptoms of ABRS may be different for each person and include:    Nasal congestion or blockage    Pain or pressure in the face    Thick, colored drainage from the nose  Other symptoms may include:    Runny nose    Fluid draining from the nose down the throat (postnasal " drip)    Headache    Cough    Pain    Fever  Diagnosing ABRS  ABRS may be diagnosed if you ve had an upper respiratory infection like a cold and cough for 10 or more days without improvement or with worsening symptoms. Your healthcare provider will ask about your symptoms and your medical history. The provider will check your vital signs, including your temperature. You ll have a physical exam. The healthcare provider will check your ears, nose, and throat. You likely won t need any tests. If ABRS comes back, you may have a culture or other tests.  Treatment for ABRS  Treatment may include:    Antibiotic medicine. This is for symptoms that last for at least 10 to 14 days.    Nasal corticosteroid medicine. Drops or spray used in the nose can lessen swelling and congestion.    Over-the-counter pain medicine. This is to lessen sinus pain and pressure.    Nasal decongestant medicine. Spray or drops may help to lessen congestion. Do not use them for more than a few days.    Salt wash (saline irrigation). This can help to loosen mucus.  Possible complications of ABRS  ABRS may come back or become long-term (chronic). In rare cases, ABRS may cause complications such as:     Inflamed tissue around the brain and spinal cord (meningitis)    Inflamed tissue around the eyes (orbital cellulitis)    Inflamed bones around the sinuses (osteitis)  These problems may need to be treated in a hospital with intravenous (IV) antibiotic medicine or surgery.  When to call the healthcare provider  Call your healthcare provider if you have any of the following:    Symptoms that don t get better, or get worse    Symptoms that don t get better after 3 to 5 days on antibiotics    Trouble seeing    Swelling around your eyes    Confusion or trouble staying awake   [employer] last reviewed this educational content on 5/1/2017 2000-2021 [employer]. All rights reserved. This information is not intended as a substitute for  professional medical care. Always follow your healthcare professional's instructions.           Patient Education     Self-Care for Sinusitis  Sinusitis can often be managed with self-care. Self-care can keep sinuses moist and make you feel more comfortable. Remember to follow your doctor's instructions closely. This can make a big difference in getting your sinus problem under control.   Drink fluids  Drinking extra fluids helps thin your mucus. This lets it drain from your sinuses more easily. Have a glass of water every hour or two. A humidifier helps in much the same way. Fluids can also offset the drying effects of certain medicines. If you use a humidifier, follow the product maker's instructions on how to use it. Clean it on a regular schedule.      Drinking plenty of water can help sinuses drain.   Use saltwater rinses  Rinses help keep your sinuses and nose moist. Mix a teaspoon of salt in 8 ounces of fresh, warm water. Use a bulb syringe to gently squirt the water into your nose a few times a day. You can also buy ready-made saline nasal sprays.   Apply hot or cold packs  Applying heat to the area surrounding your sinuses may make you feel more comfortable. Use a hot water bottle or a hand towel dipped in hot water. Some people also find ice packs effective for relieving pain.   Medicines  Your doctor may prescribe medicines to help treat your sinusitis. If you have an infection, antibiotics can help clear it up. If you are prescribed antibiotics, take all pills on schedule until they are gone, even if you feel better. Decongestants help relieve swelling. Use decongestant sprays for short periods only under the direction of your healthcare provider. If you have allergies, your doctor may prescribe medicines to help relieve them.   [employer] last reviewed this educational content on 9/1/2019 2000-2021 [employer]. All rights reserved. This information is not intended as a substitute for  professional medical care. Always follow your healthcare professional's instructions.               No follow-ups on file.    HILARY Navarro CNP  [facility]   Priya is a 60 year old who presents for the following health issues     HPI     Acute Illness  Acute illness concerns: sinus  Onset/Duration: 10 days   Symptoms:  Fever: no  Chills/Sweats: YES- chills   Headache (location?): YES  Sinus Pressure: YES, dental pain   Conjunctivitis:  no  Ear Pain: YES: both ears are plugged, feels like she is under water   Rhinorrhea: YES  Congestion: YES- nasal and chest   Sore Throat: no  Cough: YES-productive of green sputum  Wheeze: no  Decreased Appetite: can't taste or smell   Nausea: no  Vomiting: no  Diarrhea: no  Dysuria/Freq.: no  Dysuria or Hematuria: no  Fatigue/Achiness: YES- tired   Sick/Strep Exposure: no  Therapies tried and outcome: doxycycline-dx with sinus infection in [facility] clinic on 4/11. Did feel a lot better after a couple days and then after receiving 2nd covid shot on 4/19 she had sx return a week later.       Review of Systems   Constitutional, HEENT, cardiovascular, pulmonary, gi and gu systems are negative, except as otherwise noted.      Objective           Vitals:  No vitals were obtained today due to virtual visit.    Physical Exam   alert, no distress and fatigued  PSYCH: Alert and oriented times 3; coherent speech, normal   rate and volume, able to articulate logical thoughts, able   to abstract reason, no tangential thoughts, no hallucinations   or delusions  Her affect is normal  RESP: No cough, no audible wheezing, able to talk in full sentences  Remainder of exam unable to be completed due to telephone visits                Phone call duration: 5 minutes

## 2021-05-03 NOTE — TELEPHONE ENCOUNTER
Patient call was transferred to writer. [facility]. She was prescribed doxycycline, Prescription was finished on 4/18/21 and patient was feeling better. The following day, she received her 2nd COVID vaccine. She started to develop more sinus pressure, more congestion, thick green mucus and phlegm and a cough. She denies temp, sore throat, fatigue, body aches. Patient is wondering if she should be prescribed another antibiotic. Advised patient to have a virtual appointment. Video visit scheduled.  Aracelis Neville RN .

## 2021-05-27 ENCOUNTER — RECORDS - HEALTHEAST (OUTPATIENT)
Dept: ADMINISTRATIVE | Facility: CLINIC | Age: 61
End: 2021-05-27

## 2021-06-02 ENCOUNTER — RECORDS - HEALTHEAST (OUTPATIENT)
Dept: ADMINISTRATIVE | Facility: CLINIC | Age: 61
End: 2021-06-02

## 2021-06-04 ENCOUNTER — OFFICE VISIT (OUTPATIENT)
Dept: FAMILY MEDICINE | Facility: CLINIC | Age: 61
End: 2021-06-04
Payer: COMMERCIAL

## 2021-06-04 VITALS
TEMPERATURE: 97.8 F | RESPIRATION RATE: 16 BRPM | SYSTOLIC BLOOD PRESSURE: 116 MMHG | WEIGHT: 154 LBS | OXYGEN SATURATION: 99 % | BODY MASS INDEX: 25.66 KG/M2 | DIASTOLIC BLOOD PRESSURE: 74 MMHG | HEIGHT: 65 IN | HEART RATE: 75 BPM

## 2021-06-04 DIAGNOSIS — Z00.00 ROUTINE GENERAL MEDICAL EXAMINATION AT A HEALTH CARE FACILITY: Primary | ICD-10-CM

## 2021-06-04 DIAGNOSIS — F51.04 PSYCHOPHYSIOLOGICAL INSOMNIA: ICD-10-CM

## 2021-06-04 LAB
CHOLEST SERPL-MCNC: 246 MG/DL
GLUCOSE SERPL-MCNC: 93 MG/DL (ref 70–99)
HDLC SERPL-MCNC: 121 MG/DL
LDLC SERPL CALC-MCNC: 112 MG/DL
NONHDLC SERPL-MCNC: 125 MG/DL
TRIGL SERPL-MCNC: 65 MG/DL

## 2021-06-04 PROCEDURE — 80061 LIPID PANEL: CPT | Performed by: FAMILY MEDICINE

## 2021-06-04 PROCEDURE — 36415 COLL VENOUS BLD VENIPUNCTURE: CPT | Performed by: FAMILY MEDICINE

## 2021-06-04 PROCEDURE — 99396 PREV VISIT EST AGE 40-64: CPT | Performed by: FAMILY MEDICINE

## 2021-06-04 PROCEDURE — 82947 ASSAY GLUCOSE BLOOD QUANT: CPT | Performed by: FAMILY MEDICINE

## 2021-06-04 RX ORDER — HYDROXYZINE HYDROCHLORIDE 25 MG/1
25-50 TABLET, FILM COATED ORAL
Qty: 30 TABLET | Refills: 2 | Status: SHIPPED | OUTPATIENT
Start: 2021-06-04 | End: 2021-12-13

## 2021-06-04 ASSESSMENT — ENCOUNTER SYMPTOMS
NERVOUS/ANXIOUS: 0
BREAST MASS: 0

## 2021-06-04 ASSESSMENT — PAIN SCALES - GENERAL: PAINLEVEL: NO PAIN (0)

## 2021-06-04 ASSESSMENT — MIFFLIN-ST. JEOR: SCORE: 1264.42

## 2021-06-04 NOTE — PATIENT INSTRUCTIONS
Melatonin 1-3 mg at bedtime (can take up to 15 mg, but often lower doses work well).    Our Clinic hours are:  Mondays    7:20 am - 7 pm  Tues - Fri  7:20 am - 5 pm    Clinic Phone: [phone]    The clinic lab opens at 7:30 am Mon - Fri and appointments are required.    [facility]. [phone]  Monday  8 am - 7pm  Tues - Fri 8 am - 5:30 pm         Preventive Health Recommendations  Female Ages 50 - 64    Yearly exam: See your health care provider every year in order to  o Review health changes.   o Discuss preventive care.    o Review your medicines if your doctor has prescribed any.      Get a Pap test every three years (unless you have an abnormal result and your provider advises testing more often).    If you get Pap tests with HPV test, you only need to test every 5 years, unless you have an abnormal result.     You do not need a Pap test if your uterus was removed (hysterectomy) and you have not had cancer.    You should be tested each year for STDs (sexually transmitted diseases) if you're at risk.     Have a mammogram every 1 to 2 years.    Have a colonoscopy at age 50, or have a yearly FIT test (stool test). These exams screen for colon cancer.      Have a cholesterol test every 5 years, or more often if advised.    Have a diabetes test (fasting glucose) every three years. If you are at risk for diabetes, you should have this test more often.     If you are at risk for osteoporosis (brittle bone disease), think about having a bone density scan (DEXA).    Shots: Get a flu shot each year. Get a tetanus shot every 10 years.    Nutrition:     Eat at least 5 servings of fruits and vegetables each day.    Eat whole-grain bread, whole-wheat pasta and brown rice instead of white grains and rice.    Get adequate Calcium and Vitamin D.     Lifestyle    Exercise at least 150 minutes a week (30 minutes a day, 5 days a week). This will help you control your weight and prevent  disease.    Limit alcohol to one drink per day.    No smoking.     Wear sunscreen to prevent skin cancer.     See your dentist every six months for an exam and cleaning.    See your eye doctor every 1 to 2 years.

## 2021-06-04 NOTE — PROGRESS NOTES
SUBJECTIVE:   CC: Priya Krishnan is an 61 year old woman who presents for preventive health visit.       Patient has been advised of split billing requirements and indicates understanding: Yes  Healthy Habits:     Getting at least 3 servings of Calcium per day:  Yes    Bi-annual eye exam:  Yes    Dental care twice a year:  NO    Sleep apnea or symptoms of sleep apnea:  None    Diet:  Regular (no restrictions)    Frequency of exercise:  4-5 days/week    Duration of exercise:  30-45 minutes    Taking medications regularly:  Not Applicable    Medication side effects:  Not applicable    PHQ-2 Total Score: 0    Additional concerns today:  No              Today's PHQ-2 Score:   PHQ-2 ( 1999 Pfizer) 6/4/2021   Q1: Little interest or pleasure in doing things 0   Q2: Feeling down, depressed or hopeless 0   PHQ-2 Score 0   Q1: Little interest or pleasure in doing things Not at all   Q2: Feeling down, depressed or hopeless Not at all   PHQ-2 Score 0       Abuse: Current or Past (Physical, Sexual or Emotional) - No  Do you feel safe in your environment? Yes    Have you ever done Advance Care Planning? (For example, a Health Directive, POLST, or a discussion with a medical provider or your loved ones about your wishes): Yes, patient states has an Advance Care Planning document and will bring a copy to the clinic.    Social History     Tobacco Use     Smoking status: Never Smoker     Smokeless tobacco: Never Used   Substance Use Topics     Alcohol use: Yes     If you drink alcohol do you typically have >3 drinks per day or >7 drinks per week? No    Alcohol Use 6/4/2021   Prescreen: >3 drinks/day or >7 drinks/week? No   No flowsheet data found.    Reviewed orders with patient.  Reviewed health maintenance and updated orders accordingly - Yes  Lab work is in process    Breast Cancer Screening:    Breast CA Risk Assessment (FHS-7) 6/4/2021   Do you have a family history of breast, colon, or ovarian cancer? No / Unknown  "        Mammogram Screening: Recommended mammography every 1-2 years with patient discussion and risk factor consideration  Pertinent mammograms are reviewed under the imaging tab.    History of abnormal Pap smear: NO - age 30-65 PAP every 5 years with negative HPV co-testing recommended  PAP / HPV Latest Ref Rng & Units 2/9/2018   PAP - NIL   HPV 16 DNA NEG:Negative Negative   HPV 18 DNA NEG:Negative Negative   OTHER HR HPV NEG:Negative Negative     Reviewed and updated as needed this visit by clinical staff  Tobacco  Allergies  Meds   Med Hx  Surg Hx  Fam Hx  Soc Hx        Reviewed and updated as needed this visit by Provider                    Review of Systems   HENT: Negative for hearing loss.    Breasts:  Negative for tenderness, breast mass and discharge.   Genitourinary: Negative for pelvic pain, vaginal bleeding and vaginal discharge.   Psychiatric/Behavioral: The patient is not nervous/anxious.        Not sleeping well.  Falls asleep okay, wakes with anxiety.  Feels flushed and wakes up.  Doesn't snore all the time.   Using CBD oil under her tongue at night which has helped.         OBJECTIVE:   /74   Pulse 75   Temp 97.8  F (36.6  C) (Tympanic)   Resp 16   Ht 1.651 m (5' 5\")   Wt 69.9 kg (154 lb)   SpO2 99%   Breastfeeding No   BMI 25.63 kg/m    Physical Exam  GENERAL: healthy, alert and no distress  EYES: Eyes grossly normal to inspection, PERRL and conjunctivae and sclerae normal  NECK: no adenopathy, no asymmetry, masses, or scars and thyroid normal to palpation  RESP: lungs clear to auscultation - no rales, rhonchi or wheezes  BREAST: declined  CV: regular rate and rhythm, normal S1 S2, no S3 or S4, no murmur, click or rub, no peripheral edema and peripheral pulses strong  ABDOMEN: soft, nontender, no hepatosplenomegaly, no masses and bowel sounds normal  MS: no gross musculoskeletal defects noted, no edema  PSYCH: mentation appears normal, affect normal/bright    Diagnostic Test " "Results:  Labs reviewed in Epic    ASSESSMENT/PLAN:   1. Routine general medical examination at a health care facility     - Glucose  - Lipid panel reflex to direct LDL Non-fasting    2. Psychophysiological insomnia   sleep hygiene discussed  Melatonin recommended  - hydrOXYzine (ATARAX) 25 MG tablet; Take 1-2 tablets (25-50 mg) by mouth nightly as needed for anxiety  Dispense: 30 tablet; Refill: 2    Patient has been advised of split billing requirements and indicates understanding: Yes  COUNSELING:  Reviewed preventive health counseling, as reflected in patient instructions       Regular exercise       Healthy diet/nutrition    Estimated body mass index is 25.63 kg/m  as calculated from the following:    Height as of this encounter: 1.651 m (5' 5\").    Weight as of this encounter: 69.9 kg (154 lb).        She reports that she has never smoked. She has never used smokeless tobacco.      Counseling Resources:  ATP IV Guidelines  Pooled Cohorts Equation Calculator  Breast Cancer Risk Calculator  BRCA-Related Cancer Risk Assessment: FHS-7 Tool  FRAX Risk Assessment  ICSI Preventive Guidelines  Dietary Guidelines for Americans, 2010  USDA's MyPlate  ASA Prophylaxis  Lung CA Screening    Manisha Han MD  [facility]  "

## 2021-06-12 NOTE — TELEPHONE ENCOUNTER
Tried calling pt again, but got same results as below. No JEN on file with another number to call.     Chema Mejia LPN 10/28/2020 8:52 a.m.

## 2021-06-12 NOTE — TELEPHONE ENCOUNTER
----- Message from Troy Jean MD sent at 10/23/2020  2:34 PM CDT -----  This is a test only person. Can you let her know the rapid strep test is negative. Also that The back up test will be back tomorrow and that we only call if that one is positive. Thanks, Troy Jean

## 2021-06-21 NOTE — LETTER
Letter by Troy Jean MD at      Author: Troy Jean MD Service: -- Author Type: --    Filed:  Encounter Date: 10/27/2020 Status: (Other)         Priya Krishnan  [address] [facility] [address]             October 29, 2020         Dear Ms. Krishnan,    Your strep test and covid tests were both negative.      Sincerely,        Electronically signed by Troy Jean MD

## 2021-07-13 ENCOUNTER — RECORDS - HEALTHEAST (OUTPATIENT)
Dept: ADMINISTRATIVE | Facility: CLINIC | Age: 61
End: 2021-07-13

## 2021-07-15 ENCOUNTER — MYC MEDICAL ADVICE (OUTPATIENT)
Dept: FAMILY MEDICINE | Facility: CLINIC | Age: 61
End: 2021-07-15

## 2021-07-15 NOTE — TELEPHONE ENCOUNTER
DAISY Powers,     Looks like Dr. Han ordered this medication for you on 6/4/21 and it was sent to [facility].     Let me know if you need any thing else.     Klaudia Raygoza MA

## 2021-07-19 ENCOUNTER — APPOINTMENT (OUTPATIENT)
Dept: GENERAL RADIOLOGY | Facility: CLINIC | Age: 61
End: 2021-07-19
Attending: PHYSICIAN ASSISTANT
Payer: COMMERCIAL

## 2021-07-19 ENCOUNTER — NURSE TRIAGE (OUTPATIENT)
Dept: NURSING | Facility: CLINIC | Age: 61
End: 2021-07-19

## 2021-07-19 ENCOUNTER — VIRTUAL VISIT (OUTPATIENT)
Dept: URGENT CARE | Facility: CLINIC | Age: 61
End: 2021-07-19
Payer: COMMERCIAL

## 2021-07-19 ENCOUNTER — HOSPITAL ENCOUNTER (EMERGENCY)
Facility: CLINIC | Age: 61
Discharge: HOME | End: 2021-07-19
Attending: PHYSICIAN ASSISTANT | Admitting: PHYSICIAN ASSISTANT
Payer: COMMERCIAL

## 2021-07-19 VITALS
DIASTOLIC BLOOD PRESSURE: 90 MMHG | RESPIRATION RATE: 20 BRPM | BODY MASS INDEX: 25.63 KG/M2 | TEMPERATURE: 98.6 F | HEART RATE: 101 BPM | WEIGHT: 154 LBS | SYSTOLIC BLOOD PRESSURE: 151 MMHG | OXYGEN SATURATION: 99 %

## 2021-07-19 DIAGNOSIS — J18.9 PNEUMONIA: ICD-10-CM

## 2021-07-19 DIAGNOSIS — R06.02 SHORTNESS OF BREATH: Primary | ICD-10-CM

## 2021-07-19 DIAGNOSIS — Z20.822 ENCOUNTER FOR LABORATORY TESTING FOR COVID-19 VIRUS: ICD-10-CM

## 2021-07-19 DIAGNOSIS — R05.9 COUGH: ICD-10-CM

## 2021-07-19 LAB — SARS-COV-2 RNA RESP QL NAA+PROBE: NEGATIVE

## 2021-07-19 PROCEDURE — G0463 HOSPITAL OUTPT CLINIC VISIT: HCPCS | Mod: 25 | Performed by: PHYSICIAN ASSISTANT

## 2021-07-19 PROCEDURE — 99215 OFFICE O/P EST HI 40 MIN: CPT | Mod: 95 | Performed by: EMERGENCY MEDICINE

## 2021-07-19 PROCEDURE — 71046 X-RAY EXAM CHEST 2 VIEWS: CPT

## 2021-07-19 PROCEDURE — C9803 HOPD COVID-19 SPEC COLLECT: HCPCS | Performed by: PHYSICIAN ASSISTANT

## 2021-07-19 PROCEDURE — 87635 SARS-COV-2 COVID-19 AMP PRB: CPT | Performed by: PHYSICIAN ASSISTANT

## 2021-07-19 PROCEDURE — 99213 OFFICE O/P EST LOW 20 MIN: CPT | Performed by: PHYSICIAN ASSISTANT

## 2021-07-19 RX ORDER — ALBUTEROL SULFATE 90 UG/1
1-2 AEROSOL, METERED RESPIRATORY (INHALATION) EVERY 4 HOURS PRN
Qty: 18 G | Refills: 0 | Status: SHIPPED | OUTPATIENT
Start: 2021-07-19 | End: 2021-07-19

## 2021-07-19 RX ORDER — LEVOFLOXACIN 750 MG/1
750 TABLET, FILM COATED ORAL DAILY
Qty: 5 TABLET | Refills: 0 | Status: SHIPPED | OUTPATIENT
Start: 2021-07-19 | End: 2021-12-08

## 2021-07-19 RX ORDER — ALBUTEROL SULFATE 90 UG/1
1-2 AEROSOL, METERED RESPIRATORY (INHALATION) EVERY 4 HOURS PRN
Qty: 18 G | Refills: 0 | Status: SHIPPED | OUTPATIENT
Start: 2021-07-19 | End: 2021-12-13

## 2021-07-19 RX ORDER — LEVOFLOXACIN 750 MG/1
750 TABLET, FILM COATED ORAL DAILY
Qty: 5 TABLET | Refills: 0 | Status: SHIPPED | OUTPATIENT
Start: 2021-07-19 | End: 2021-07-19

## 2021-07-19 ASSESSMENT — ENCOUNTER SYMPTOMS
FATIGUE: 1
MUSCULOSKELETAL NEGATIVE: 1
RHINORRHEA: 1
APPETITE CHANGE: 1
EYES NEGATIVE: 1
ACTIVITY CHANGE: 1
CARDIOVASCULAR NEGATIVE: 1
PSYCHIATRIC NEGATIVE: 1
PALPITATIONS: 0
WHEEZING: 1
COUGH: 1
GASTROINTESTINAL NEGATIVE: 1

## 2021-07-19 NOTE — ED PROVIDER NOTES
History     Chief Complaint   Patient presents with     Wheezing     fever, cough, loss of taste and smell symptoms started 6 days ago. pt is vaccinated. pt is requesting a chest xray to rule out pneumonia. Pt does not want to go to the ED.      Cough     HPI  Priya Krishnan is a 61 year old female who presents today with 6 days of cough, congestion, low grade fevers up to 100.4F, and loss of taste and smell. Patient states she was watching her granddaughter who was diagnosed with pneumonia and had negative covid swab. Patient has had covid vaccines back in April. Patient states some wheezing and shortness of breath with coughing. Patient has been using inhaler. Patient denies headache, sinus pain or pressure, ear pain, sore throat, chest pain, heart racing or skipping beats, abdominal pain, nausea or vomiting, calf pain or swelling, rash.  Patient states she has been taking Sudafed with some improvement.  She states symptoms are worse at night.    Allergies:  Allergies   Allergen Reactions     Amoxicillin      blisters       Problem List:    Patient Active Problem List    Diagnosis Date Noted     Phobia 08/12/2019     Priority: Medium     Overview:   Created by Conversion    Replacement Utility updated for latest IMO load          Past Medical History:    No past medical history on file.    Past Surgical History:    No past surgical history on file.    Family History:    Family History   Problem Relation Age of Onset     Dementia Mother      Cerebrovascular Disease Mother      Heart Disease Father      Asthma Son        Social History:  Marital Status:   [2]  Social History     Tobacco Use     Smoking status: Never Smoker     Smokeless tobacco: Never Used   Substance Use Topics     Alcohol use: Yes     Drug use: No        Medications:    albuterol (PROAIR HFA/PROVENTIL HFA/VENTOLIN HFA) 108 (90 Base) MCG/ACT inhaler  levofloxacin (LEVAQUIN) 750 MG tablet  albuterol (PROAIR HFA/PROVENTIL HFA/VENTOLIN HFA)  108 (90 Base) MCG/ACT inhaler  ALPRAZolam (XANAX) 0.25 MG tablet  hydrOXYzine (ATARAX) 25 MG tablet          Review of Systems   Constitutional: Positive for activity change, appetite change and fatigue.   HENT: Positive for rhinorrhea.    Eyes: Negative.    Respiratory: Positive for cough and wheezing.    Cardiovascular: Negative.  Negative for chest pain, palpitations and leg swelling.   Gastrointestinal: Negative.    Genitourinary: Negative.    Musculoskeletal: Negative.    Skin: Negative.    Psychiatric/Behavioral: Negative.    All other systems reviewed and are negative.      Physical Exam   BP: (!) 151/90  Pulse: 101  Temp: 98.6  F (37  C)  Resp: 20  Weight: 69.9 kg (154 lb)  SpO2: 99 %      Physical Exam  Vitals and nursing note reviewed.   Constitutional:       General: She is not in acute distress.     Appearance: Normal appearance. She is normal weight. She is ill-appearing. She is not toxic-appearing.   HENT:      Right Ear: Tympanic membrane and ear canal normal.      Left Ear: Tympanic membrane and ear canal normal.      Nose: Nose normal.      Mouth/Throat:      Mouth: Mucous membranes are moist.      Pharynx: Oropharynx is clear. No oropharyngeal exudate or posterior oropharyngeal erythema.   Eyes:      General: No scleral icterus.        Right eye: No discharge.         Left eye: No discharge.      Extraocular Movements: Extraocular movements intact.      Conjunctiva/sclera: Conjunctivae normal.      Pupils: Pupils are equal, round, and reactive to light.   Cardiovascular:      Rate and Rhythm: Normal rate and regular rhythm.      Heart sounds: Normal heart sounds.   Pulmonary:      Effort: Pulmonary effort is normal. No respiratory distress.      Breath sounds: No stridor. Wheezing and rales (bilateral lower lungs ) present. No rhonchi.   Chest:      Chest wall: No tenderness.   Musculoskeletal:         General: Normal range of motion.      Cervical back: Normal range of motion and neck supple.    Skin:     General: Skin is warm.      Capillary Refill: Capillary refill takes less than 2 seconds.      Findings: No erythema or rash.   Neurological:      General: No focal deficit present.      Mental Status: She is alert and oriented to person, place, and time.      Sensory: No sensory deficit.      Motor: No weakness.      Gait: Gait normal.   Psychiatric:         Mood and Affect: Mood normal.         Behavior: Behavior normal.         Thought Content: Thought content normal.         Judgment: Judgment normal.         ED Course        Procedures             Critical Care time:  none               Results for orders placed or performed during the hospital encounter of 07/19/21 (from the past 24 hour(s))   Symptomatic COVID-19 Virus (Coronavirus) by PCR Nasopharyngeal    Specimen: Nasopharyngeal; Swab    Narrative    The following orders were created for panel order Symptomatic COVID-19 Virus (Coronavirus) by PCR Nasopharyngeal.  Procedure                               Abnormality         Status                     ---------                               -----------         ------                     SARS-COV2 (COVID-19) Vir...[[id number]]  Normal              Final result                 Please view results for these tests on the individual orders.   SARS-COV2 (COVID-19) Virus RT-PCR    Specimen: Nasopharyngeal; Swab   Result Value Ref Range    SARS CoV2 PCR Negative Negative    Narrative    Testing was performed using the forrest  SARS-CoV-2 & Influenza A/B Assay on the forrest  [employer]amila  System.  This test should be ordered for the detection of SARS-COV-2 in individuals who meet SARS-CoV-2 clinical and/or epidemiological criteria. Test performance is unknown in asymptomatic patients.  This test is for in vitro diagnostic use under the FDA EUA for laboratories certified under CLIA to perform moderate and/or high complexity testing. This test has not been FDA cleared or approved.  A negative test does not rule out the  presence of PCR inhibitors in the specimen or target RNA in concentration below the limit of detection for the assay. The possibility of a false negative should be considered if the patient's recent exposure or clinical presentation suggests COVID-19.  [facility] are certified under the Clinical Laboratory Improvement Amendments of 1988 (CLIA-88) as qualified to perform moderate and/or high complexity laboratory testing.   Chest XR,  PA & LAT    Narrative    CHEST TWO VIEWS  7/19/2021 4:25 PM     HISTORY:  Cough. Wheezing. Rales.    COMPARISON: 11/13/2020.      Impression    IMPRESSION: Mild hazy opacity at the left lung base could be related  to atelectasis and/or pneumonia. The lungs are otherwise clear. No  pleural effusions. Heart size and pulmonary vascularity are within  normal limits.    JEFF ALBERTS MD         SYSTEM ID:  [id number]       Medications - No data to display    Assessments & Plan (with Medical Decision Making)     I have reviewed the nursing notes.    I have reviewed the findings, diagnosis, plan and need for follow up with the patient.  Priya Krishnan is a 61 year old female who presents today with 6 days of cough, congestion, low grade fevers up to 100.4F, and loss of taste and smell. Patient states she was watching her granddaughter who was diagnosed with pneumonia and had negative covid swab. Patient has had covid vaccines back in April. Patient states some wheezing and shortness of breath with coughing. Patient has been using inhaler. Patient denies headache, sinus pain or pressure, ear pain, sore throat, chest pain, heart racing or skipping beats, abdominal pain, nausea or vomiting, calf pain or swelling, rash.  Patient states she has been taking Sudafed with some improvement.  She states symptoms are worse at night.  See exam findings above. Patient chest x-ray obtained which shows mild hazy opacity at the left lung base could be related to atelectasis and/or  pneumonia.  No pleural effusion.  Exam findings with chest x-ray consistent with early pneumonia due to the rales noted on exam. Patient states history of amoxicillin blister like rash and she is concerned about having a possible reaction with cephalosporins family so she like to not take a cephalosporin at this time.  Patient sent home with prescription Levaquin 1 tablet daily for 5 days.  Patient to follow-up primary care provider appointment in the next 3 to 5 days.  Patient aware of this and discharged in stable condition.  Symptomatic treatments also discussed.  Patient increase fluids, rest, Tylenol and ibuprofen over-the-counter as needed.  Also sent home with albuterol inhaler as well as Levaquin.  His vitals within normal limits at this time other than elevated blood pressure and patient being slightly tachycardic.  Patient declined further work-up to rule out cardiac or pulmonary embolism in the emergency department today.  Patient is aware of risks and discharged in stable condition.    Discharge Medication List as of 7/19/2021  5:37 PM          Final diagnoses:   Pneumonia   Cough   Encounter for laboratory testing for COVID-19 virus       7/19/2021   [facility] EMERGENCY DEPT     Jessica Zimmer PA-C  07/19/21 1135

## 2021-07-19 NOTE — DISCHARGE INSTRUCTIONS
Use medication as directed increase fluids, rest follow-up with primary care doctor for recheck in this week.  See appointment time on discharge paperwork.    In the emergency department if symptoms worsen or change this includes shortness of breath, fever greater than 104F, shortness of breath, chest pain, or change in symptoms.

## 2021-07-19 NOTE — TELEPHONE ENCOUNTER
"Clinic Action Needed:Yes, Please call patient [phone] (home) ok to leave detailed message. Patient is on a meeting at 930 a.m..    Second Level Triage.  Patient has a 120 pm virtual visit.   Per disposition ED/or PCP triage. Virtual Visit was not available sooner.  Please advise if patient should be seen in ED or wait for Virtual Visit.    Reason for Call:  Patient calling reporting cough, fever, intermittent wheezing, \"rattling in back\" starting on 7/14/21.    Temp ranging to 100.5 Oral.  Patient stating she has an albuterol inhaler from previous cold symptoms 2 months ago that she has been using.  Stating she has been using Albuterol every 4 hours for past 24 hours with some relief which resolves wheezing. Stating she probably could use it more frequently then every 4 hours which she hasn't done. Denies chest pain.   Denies shortness of breath at rest. Patient feels she will need a chest x-ray and is questioning keeping Virtual Visit?    Message routed to PCP Pool.    Debo Hicks RN  [facility]      COVID 19 Nurse Triage Plan/Patient Instructions    Please be aware that novel coronavirus (COVID-19) may be circulating in the community. If you develop symptoms such as fever, cough, or SOB or if you have concerns about the presence of another infection including coronavirus (COVID-19), please contact your health care provider or visit [email or web address].     Disposition/Instructions    Virtual Visit with provider recommended. Reference Visit Selection Guide.    Thank you for taking steps to prevent the spread of this virus.  o Limit your contact with others.  o Wear a simple mask to cover your cough.  o Wash your hands well and often.    Resources    [facility]: About COVID-19: [email or web address]    CDC: What to Do If You're Sick: www.cdc.gov/coronavirus/2019-ncov/about/steps-when-sick.html    CDC: Ending Home Isolation: " www.cdc.gov/coronavirus/2019-ncov/hcp/disposition-in-home-patients.html     CDC: Caring for Someone: www.cdc.gov/coronavirus/2019-ncov/if-you-are-sick/care-for-someone.html     [facility]: Interim Guidance for Hospital Discharge to Home: www.health.[facility].[place].us/diseases/coronavirus/hcp/hospdischarge.pdf    [facility] clinical trials (COVID-19 research studies): [email or web address].[facility]-clinical-trials     Below are the COVID-19 hotlines at the [place] Department of Health ([facility]). Interpreters are available.   o For health questions: Call 912-024-1239 or 1-657.307.3191 (7 a.m. to 7 p.m.)  o For questions about schools and childcare: Call 167-020-9995 or 1-322.828.7830 (7 a.m. to 7 p.m.)                     Reason for Disposition    MILD difficulty breathing (e.g., minimal/no SOB at rest, SOB with walking, pulse <100)    Additional Information    Negative: Difficult to awaken or acting confused (e.g., disoriented, slurred speech)    Negative: SEVERE difficulty breathing (e.g., struggling for each breath, speaks in single words)    Negative: Bluish (or gray) lips or face now    Negative: Shock suspected (e.g., cold/pale/clammy skin, too weak to stand, low BP, rapid pulse)    Negative: Sounds like a life-threatening emergency to the triager    Negative: [1] COVID-19 exposure AND [2] has not completed COVID-19 vaccine series AND [3] no symptoms    Negative: [1] COVID-19 exposure AND [2] completed COVID-19 vaccine series (fully vaccinated) AND [3] no symptoms    Negative: COVID-19 vaccine reaction suspected (e.g., fever, headache, muscle aches) occurring during days 1-3 after getting vaccine    Negative: COVID-19 vaccine, questions about    Negative: [1] COVID-19 vaccine series completed (fully vaccinated) in past 3 months AND [2] new-onset of COVID-19 symptoms BUT [3] no known exposure    Negative: [1] Had lab test confirmed COVID-19 infection within last 3 monthsAND [2] new-onset of COVID-19 symptoms BUT [3] no  known exposure    Negative: [1] Lives with someone known to have influenza (flu test positive) AND [2] flu-like symptoms (e.g., cough, runny nose, sore throat, SOB; with or without fever)    Negative: [1] Adult with possible COVID-19 symptoms AND [2] triager concerned about severity of symptoms or other causes    Negative: COVID-19 and breastfeeding, questions about    Negative: SEVERE or constant chest pain or pressure (Exception: mild central chest pain, present only when coughing)    Negative: MODERATE difficulty breathing (e.g., speaks in phrases, SOB even at rest, pulse 100-120)    Negative: [1] Headache AND [2] stiff neck (can't touch chin to chest)    Protocols used: CORONAVIRUS (COVID-19) DIAGNOSED OR [id number] 3.25

## 2021-07-19 NOTE — PROGRESS NOTES
Video visit:    Start time: 1:23 PM  Stop time: 1:33 PM    Assessment: 5 to 6-day history of cough and shortness of breath.  T-max 100.4.  She is also had chills.  She had does have a history of recurrent bronchospasm with URIs and does have a albuterol inhaler which is caused transient improvement.  She has had loss of taste and smell.  She has had 2 previous clinical presentations of this nature and is tested negative for Covid each time.  She is fully vaccinated against Covid.    Plan: Best the patient be seen face-to-face due to her shortness of breath and fever.    HPI: Patient is a 61-year-old female who has been ill since last Wednesday, i.e., 6 days.  She has had a deep cough.  She had pain in her left lower thorax region.  She has had some nasal congestion and chills.  Low-grade fever with a T-max of 100.4.  She has an albuterol inhaler which she is used for previous reactive airway symptoms with cold which she has been given her transient improvement.  She has had loss of taste and smell.  She has had 2 similar clinical presentations including lack of taste and smell and is tested negative for Covid both times.      ROS: See HPI otherwise normal.    Allergies   Allergen Reactions     Amoxicillin      blisters      Current Outpatient Medications   Medication Sig Dispense Refill     albuterol (PROAIR HFA/PROVENTIL HFA/VENTOLIN HFA) 108 (90 Base) MCG/ACT inhaler Inhale 2 puffs into the lungs every 4 hours as needed for wheezing 1 Inhaler 0     ALPRAZolam (XANAX) 0.25 MG tablet Take 1 tablet (0.25 mg) by mouth 3 times daily as needed for anxiety With flying (Patient not taking: Reported on 10/20/2020) 24 tablet 0     hydrOXYzine (ATARAX) 25 MG tablet Take 1-2 tablets (25-50 mg) by mouth nightly as needed for anxiety 30 tablet 2         PE: No acute distress.  Alert.  Nondyspneic appearing on video visit.        TREATMENT: None.      ASSESSMENT: Shortness of breath with cough and left posterior thoracic pain.   Best that patient be seen and examined face-to-face.      DIAGNOSIS: Cough.  Shortness of breath.      PLAN: Patient is directed to urgent care to be seen today.  She plans to go to the [place] site.    Time: 10 minutes

## 2021-07-21 ENCOUNTER — RECORDS - HEALTHEAST (OUTPATIENT)
Dept: ADMINISTRATIVE | Facility: CLINIC | Age: 61
End: 2021-07-21

## 2021-07-22 NOTE — PROGRESS NOTES
"    Assessment & Plan     Community acquired pneumonia of left lower lobe of lung  Improving gradually, WBC is normal.    Granddaughter had RSV recently - so that is probably what she had    - CBC with platelets and differential; Future  - CBC with platelets and differential                 No follow-ups on file.    Manisha Han MD  [facility]    Nadiya Powers is a 61 year old who presents for the following health issues     HPI     ED/UC Followup:  Facility:  [facility] Emergency Dept  Date of visit: 7/19/21  Reason for visit: Pneumonia  HPI  Priya Krishnan is a 61 year old female who presents today with 6 days of cough, congestion, low grade fevers up to 100.4F, and loss of taste and smell. Patient states she was watching her granddaughter who was diagnosed with pneumonia and had negative covid swab. Patient has had covid vaccines back in April. Patient states some wheezing and shortness of breath with coughing. Patient has been using inhaler. Patient denies headache, sinus pain or pressure, ear pain, sore throat, chest pain, heart racing or skipping beats, abdominal pain, nausea or vomiting, calf pain or swelling, rash.  Patient states she has been taking Sudafed with some improvement.  She states symptoms are worse at night.  Current Status: Patient is doing better. Patient feels like night time is more of a struggle. Patient is still having moderate cough with shortness of breath. Patient has been feeling\"oozy\" Patient's last dose of Levaquin is today.       Still having low grade fevers 100.4 or so.  Has one more day of levaquin.     Symptoms started 8-9 days ago.  Sense of taste/smell returned yesterday    Starting to feel a little better, much better than Monday (today is Friday).  Was able to sleep more than a 4 hour stretch last night.           Review of Systems   Constitutional, HEENT, cardiovascular, pulmonary, gi and gu systems are negative, except as " "otherwise noted.      Objective    /88   Pulse 86   Temp 98.6  F (37  C) (Tympanic)   Resp 16   Ht 1.651 m (5' 5\")   Wt 69.9 kg (154 lb)   SpO2 98%   Breastfeeding No   BMI 25.63 kg/m    Body mass index is 25.63 kg/m .  Physical Exam   GENERAL: healthy, alert and no distress  NECK: no adenopathy, no asymmetry, masses, or scars and thyroid normal to palpation  RESP: rhonchi bilateral and throughout and expiratory wheezes   CV: regular rate and rhythm, normal S1 S2, no S3 or S4, no murmur, click or rub, no peripheral edema and peripheral pulses strong  ABDOMEN: soft, nontender, no hepatosplenomegaly, no masses and bowel sounds normal  MS: no gross musculoskeletal defects noted, no edema    Results for orders placed or performed in visit on 07/23/21 (from the past 24 hour(s))   CBC with platelets and differential    Narrative    The following orders were created for panel order CBC with platelets and differential.  Procedure                               Abnormality         Status                     ---------                               -----------         ------                     CBC with platelets and d...[[id number]]                      Final result                 Please view results for these tests on the individual orders.   CBC with platelets and differential   Result Value Ref Range    WBC Count 6.8 4.0 - 11.0 10e3/uL    RBC Count 4.89 3.80 - 5.20 10e6/uL    Hemoglobin 13.5 11.7 - 15.7 g/dL    Hematocrit 41.6 35.0 - 47.0 %    MCV 85 78 - 100 fL    MCH 27.6 26.5 - 33.0 pg    MCHC 32.5 31.5 - 36.5 g/dL    RDW 15.0 10.0 - 15.0 %    Platelet Count 229 150 - 450 10e3/uL    % Neutrophils 72 %    % Lymphocytes 16 %    % Monocytes 8 %    % Eosinophils 4 %    % Basophils 0 %    Absolute Neutrophils 4.9 1.6 - 8.3 10e3/uL    Absolute Lymphocytes 1.1 0.8 - 5.3 10e3/uL    Absolute Monocytes 0.5 0.0 - 1.3 10e3/uL    Absolute Eosinophils 0.3 0.0 - 0.7 10e3/uL    Absolute Basophils 0.0 0.0 - 0.2 10e3/uL "

## 2021-07-23 ENCOUNTER — OFFICE VISIT (OUTPATIENT)
Dept: FAMILY MEDICINE | Facility: CLINIC | Age: 61
End: 2021-07-23
Payer: COMMERCIAL

## 2021-07-23 VITALS
SYSTOLIC BLOOD PRESSURE: 116 MMHG | TEMPERATURE: 98.6 F | WEIGHT: 154 LBS | BODY MASS INDEX: 25.66 KG/M2 | OXYGEN SATURATION: 98 % | RESPIRATION RATE: 16 BRPM | HEIGHT: 65 IN | DIASTOLIC BLOOD PRESSURE: 88 MMHG | HEART RATE: 86 BPM

## 2021-07-23 DIAGNOSIS — J18.9 COMMUNITY ACQUIRED PNEUMONIA OF LEFT LOWER LOBE OF LUNG: Primary | ICD-10-CM

## 2021-07-23 LAB
BASOPHILS # BLD AUTO: 0 10E3/UL (ref 0–0.2)
BASOPHILS NFR BLD AUTO: 0 %
EOSINOPHIL # BLD AUTO: 0.3 10E3/UL (ref 0–0.7)
EOSINOPHIL NFR BLD AUTO: 4 %
ERYTHROCYTE [DISTWIDTH] IN BLOOD BY AUTOMATED COUNT: 15 % (ref 10–15)
HCT VFR BLD AUTO: 41.6 % (ref 35–47)
HGB BLD-MCNC: 13.5 G/DL (ref 11.7–15.7)
LYMPHOCYTES # BLD AUTO: 1.1 10E3/UL (ref 0.8–5.3)
LYMPHOCYTES NFR BLD AUTO: 16 %
MCH RBC QN AUTO: 27.6 PG (ref 26.5–33)
MCHC RBC AUTO-ENTMCNC: 32.5 G/DL (ref 31.5–36.5)
MCV RBC AUTO: 85 FL (ref 78–100)
MONOCYTES # BLD AUTO: 0.5 10E3/UL (ref 0–1.3)
MONOCYTES NFR BLD AUTO: 8 %
NEUTROPHILS # BLD AUTO: 4.9 10E3/UL (ref 1.6–8.3)
NEUTROPHILS NFR BLD AUTO: 72 %
PLATELET # BLD AUTO: 229 10E3/UL (ref 150–450)
RBC # BLD AUTO: 4.89 10E6/UL (ref 3.8–5.2)
WBC # BLD AUTO: 6.8 10E3/UL (ref 4–11)

## 2021-07-23 PROCEDURE — 85025 COMPLETE CBC W/AUTO DIFF WBC: CPT | Performed by: FAMILY MEDICINE

## 2021-07-23 PROCEDURE — 36415 COLL VENOUS BLD VENIPUNCTURE: CPT | Performed by: FAMILY MEDICINE

## 2021-07-23 PROCEDURE — 99213 OFFICE O/P EST LOW 20 MIN: CPT | Performed by: FAMILY MEDICINE

## 2021-07-23 ASSESSMENT — MIFFLIN-ST. JEOR: SCORE: 1264.42

## 2021-07-23 ASSESSMENT — PAIN SCALES - GENERAL: PAINLEVEL: NO PAIN (0)

## 2021-07-23 NOTE — PATIENT INSTRUCTIONS
Our Clinic hours are:  Mondays    7:20 am - 7 pm  Tues -  Fri  7:20 am - 5 pm    Clinic Phone: [phone]    The clinic lab opens at 7:30 am Mon - Fri and appointments are required.    [facility]. [phone]  Monday  8 am - 7pm  Tues - Fri 8 am - 5:30 pm

## 2021-10-10 ENCOUNTER — HEALTH MAINTENANCE LETTER (OUTPATIENT)
Age: 61
End: 2021-10-10

## 2021-12-08 ENCOUNTER — OFFICE VISIT (OUTPATIENT)
Dept: FAMILY MEDICINE | Facility: CLINIC | Age: 61
End: 2021-12-08
Payer: COMMERCIAL

## 2021-12-08 VITALS
OXYGEN SATURATION: 99 % | SYSTOLIC BLOOD PRESSURE: 132 MMHG | BODY MASS INDEX: 25.07 KG/M2 | TEMPERATURE: 97.1 F | RESPIRATION RATE: 18 BRPM | HEIGHT: 66 IN | HEART RATE: 79 BPM | DIASTOLIC BLOOD PRESSURE: 80 MMHG | WEIGHT: 156 LBS

## 2021-12-08 DIAGNOSIS — Z23 NEED FOR PROPHYLACTIC VACCINATION AND INOCULATION AGAINST INFLUENZA: Primary | ICD-10-CM

## 2021-12-08 DIAGNOSIS — B35.6 TINEA CRURIS: ICD-10-CM

## 2021-12-08 PROCEDURE — 90471 IMMUNIZATION ADMIN: CPT | Performed by: FAMILY MEDICINE

## 2021-12-08 PROCEDURE — 90682 RIV4 VACC RECOMBINANT DNA IM: CPT | Performed by: FAMILY MEDICINE

## 2021-12-08 PROCEDURE — 99213 OFFICE O/P EST LOW 20 MIN: CPT | Mod: 25 | Performed by: FAMILY MEDICINE

## 2021-12-08 RX ORDER — TERBINAFINE HYDROCHLORIDE 250 MG/1
250 TABLET ORAL DAILY
Qty: 14 TABLET | Refills: 0 | Status: SHIPPED | OUTPATIENT
Start: 2021-12-08 | End: 2021-12-22

## 2021-12-08 ASSESSMENT — MIFFLIN-ST. JEOR: SCORE: 1289.36

## 2021-12-08 ASSESSMENT — PAIN SCALES - GENERAL: PAINLEVEL: NO PAIN (0)

## 2021-12-08 NOTE — PROGRESS NOTES
Assessment & Plan     Need for prophylactic vaccination and inoculation against influenza  - INFLUENZA QUAD, RECOMBINANT, P-FREE (RIV4) (FLUBLOK)    Tinea cruris  The lesions looked a bit atypical, however, I do believe most likely still fungal and that some of the abnormal appearance was due to concurrent topical corticosteroid use, at this point would consider this to be a dermatophyte infection refractory to topical, which would indicate oral antifungal  -Instructed her to stop corticosteroid for now and in future to use sparingly due to atrophy risks  - terbinafine (LAMISIL) 250 MG tablet  Dispense: 14 tablet; Refill: 0    Patient Instructions   If it's getting worse, let us know.  If in 2 weeks it's been getting better but not all the way gone, send me a [employer] message and I will send you another week or two.    Return if symptoms worsen or fail to improve.    Maria E Jean MD  [facility]   Priya is a 61 year old who presents for the following health issues  accompanied by her self.    HPI     Rash  Onset/Duration: 1 mo ago   Description  Location: inner thigh   Character: raised, flakey, burning, red  Itching: moderate  Intensity:  moderate  Progression of Symptoms:  Worsening spreading   Accompanying signs and symptoms:   Fever: no  Body aches or joint pain: no  Sore throat symptoms: no  Recent cold symptoms: no  History:           Previous episodes of similar rash: about 1 yr ago same area   New exposures:  None  Recent travel: no  Exposure to similar rash: no  Precipitating or alleviating factors: lotrimin, hydrocortisone    Therapies tried and outcome: hydrocortisone cream, Lotrimin  -  Did take away last yr but has not this time      Started out as one welt and then she started using the lotrimin, it gradually got worse. At first seemed to get better even though a couple more lesions popped up and then got worse with more lesions as well as bigger  "area    Review of Systems   Constitutional, HEENT, cardiovascular, pulmonary, gi and gu systems are negative, except as otherwise noted.      Objective    Resp 18   Ht 1.676 m (5' 6\")   Wt 70.8 kg (156 lb)   BMI 25.18 kg/m    Body mass index is 25.18 kg/m .  Physical Exam   GENERAL: healthy, alert and no distress  NECK: no adenopathy, no asymmetry, masses, or scars and thyroid normal to palpation  RESP: lungs clear to auscultation - no rales, rhonchi or wheezes  CV: regular rate and rhythm, normal S1 S2, no S3 or S4, no murmur, click or rub, no peripheral edema and peripheral pulses strong  ABDOMEN: soft, nontender, no hepatosplenomegaly, no masses and bowel sounds normal  MS: no gross musculoskeletal defects noted, no edema  SKIN: Underlying skin is normal in appearance, she has going coin-sized lesions that are macular, well demarcated and brownish scattered along both inner thighs near groin, no other signs of infection such as erythema, edema, induration, fluctuance            "

## 2021-12-08 NOTE — PATIENT INSTRUCTIONS
If it's getting worse, let us know.  If in 2 weeks it's been getting better but not all the way gone, send me a [employer] message and I will send you another week or two.

## 2022-02-01 ENCOUNTER — DOCUMENTATION ONLY (OUTPATIENT)
Dept: OTHER | Facility: CLINIC | Age: 62
End: 2022-02-01
Payer: COMMERCIAL

## 2022-05-21 ENCOUNTER — HEALTH MAINTENANCE LETTER (OUTPATIENT)
Age: 62
End: 2022-05-21

## 2022-07-16 ENCOUNTER — HEALTH MAINTENANCE LETTER (OUTPATIENT)
Age: 62
End: 2022-07-16

## 2022-09-18 ENCOUNTER — HEALTH MAINTENANCE LETTER (OUTPATIENT)
Age: 62
End: 2022-09-18

## 2022-10-10 ENCOUNTER — E-VISIT (OUTPATIENT)
Dept: FAMILY MEDICINE | Facility: CLINIC | Age: 62
End: 2022-10-10
Payer: COMMERCIAL

## 2022-10-10 DIAGNOSIS — J01.90 ACUTE SINUSITIS WITH SYMPTOMS > 10 DAYS: Primary | ICD-10-CM

## 2022-10-10 PROCEDURE — 99421 OL DIG E/M SVC 5-10 MIN: CPT | Performed by: FAMILY MEDICINE

## 2022-10-11 RX ORDER — DOXYCYCLINE HYCLATE 100 MG
100 TABLET ORAL 2 TIMES DAILY
Qty: 14 TABLET | Refills: 0 | Status: SHIPPED | OUTPATIENT
Start: 2022-10-11 | End: 2022-10-18

## 2022-10-11 NOTE — PATIENT INSTRUCTIONS
Dear Priya,     After reviewing your responses, I've been able to diagnose you with?a sinus infection caused by bacteria.?     Based on your responses and diagnosis, I have prescribed Doxycycline to treat your symptoms. I have sent this to your pharmacy.? Azithromycin (Z-angelito) is no longer indicated due to high levels of resistance.      Also a reminder that you are overdue for a yearly preventative exam, mammogram, etc.  Please schedule to be seen.      It is also important to stay well hydrated, get lots of rest and take over-the-counter decongestants,?tylenol?or ibuprofen if you?are able to?take those medications per your primary care provider to help relieve discomfort.?     It is important that you take?all of?your prescribed medication even if your symptoms are improving after a few doses.? Taking?all of?your medicine helps prevent the symptoms from returning.?     If your symptoms worsen, you develop severe headache, vomiting, high fever (>102), or are not improving in 7 days, please contact your primary care provider for an appointment or visit any of our convenient Walk-in Care or Urgent Care Centers to be seen which can be found on our website?here.?     Thanks again for choosing?us?as your health care partner,?   ?  Manisha Han MD?

## 2022-11-17 ENCOUNTER — HOSPITAL ENCOUNTER (OUTPATIENT)
Dept: MAMMOGRAPHY | Facility: CLINIC | Age: 62
Discharge: HOME | End: 2022-11-17
Attending: FAMILY MEDICINE | Admitting: FAMILY MEDICINE
Payer: COMMERCIAL

## 2022-11-17 DIAGNOSIS — Z12.31 VISIT FOR SCREENING MAMMOGRAM: ICD-10-CM

## 2022-11-17 PROCEDURE — 77067 SCR MAMMO BI INCL CAD: CPT

## 2022-12-23 ENCOUNTER — OFFICE VISIT (OUTPATIENT)
Dept: FAMILY MEDICINE | Facility: CLINIC | Age: 62
End: 2022-12-23
Payer: COMMERCIAL

## 2022-12-23 VITALS
RESPIRATION RATE: 16 BRPM | DIASTOLIC BLOOD PRESSURE: 64 MMHG | HEIGHT: 65 IN | OXYGEN SATURATION: 100 % | TEMPERATURE: 97.8 F | BODY MASS INDEX: 27.42 KG/M2 | SYSTOLIC BLOOD PRESSURE: 118 MMHG | WEIGHT: 164.6 LBS | HEART RATE: 97 BPM

## 2022-12-23 DIAGNOSIS — L30.9 DERMATITIS: ICD-10-CM

## 2022-12-23 DIAGNOSIS — H91.90 HEARING LOSS, UNSPECIFIED HEARING LOSS TYPE, UNSPECIFIED LATERALITY: ICD-10-CM

## 2022-12-23 DIAGNOSIS — Z12.4 SCREENING FOR CERVICAL CANCER: ICD-10-CM

## 2022-12-23 DIAGNOSIS — Z00.00 ROUTINE GENERAL MEDICAL EXAMINATION AT A HEALTH CARE FACILITY: Primary | ICD-10-CM

## 2022-12-23 DIAGNOSIS — H93.13 TINNITUS, BILATERAL: ICD-10-CM

## 2022-12-23 PROCEDURE — 99396 PREV VISIT EST AGE 40-64: CPT | Mod: 25 | Performed by: FAMILY MEDICINE

## 2022-12-23 PROCEDURE — 87624 HPV HI-RISK TYP POOLED RSLT: CPT | Performed by: FAMILY MEDICINE

## 2022-12-23 PROCEDURE — G0145 SCR C/V CYTO,THINLAYER,RESCR: HCPCS | Performed by: FAMILY MEDICINE

## 2022-12-23 PROCEDURE — 99213 OFFICE O/P EST LOW 20 MIN: CPT | Mod: 25 | Performed by: FAMILY MEDICINE

## 2022-12-23 RX ORDER — TRIAMCINOLONE ACETONIDE 1 MG/G
CREAM TOPICAL 2 TIMES DAILY
Qty: 45 G | Refills: 0 | Status: SHIPPED | OUTPATIENT
Start: 2022-12-23 | End: 2023-03-20

## 2022-12-23 ASSESSMENT — ENCOUNTER SYMPTOMS
SORE THROAT: 0
HEMATURIA: 0
FEVER: 0
EYE PAIN: 0
ABDOMINAL PAIN: 0
NAUSEA: 0
DIZZINESS: 1
HEMATOCHEZIA: 0
DYSURIA: 0
NERVOUS/ANXIOUS: 0
MYALGIAS: 0
COUGH: 0
WEAKNESS: 0
HEADACHES: 0
DIARRHEA: 0
SHORTNESS OF BREATH: 0
CHILLS: 0
HEARTBURN: 0
ARTHRALGIAS: 0
JOINT SWELLING: 0
FREQUENCY: 0
PALPITATIONS: 0
PARESTHESIAS: 0
BREAST MASS: 0
CONSTIPATION: 0

## 2022-12-23 ASSESSMENT — PAIN SCALES - GENERAL: PAINLEVEL: NO PAIN (0)

## 2022-12-23 NOTE — PATIENT INSTRUCTIONS
"See audiology and ENT    Dr. Bhavani Hernández Vertigo Treatment - you tube          Thank you for choosing [facility].      When you are out of refills or the refills say \"zero\", it is time to schedule your next appointment in clinic!    Our Clinic hours are:  Mondays    7:20 am - 7 pm  Tues -  Fri  7:20 am - 5 pm    To speak to the care team, call [phone] option #2      The clinic lab opens at 7:30 am Mon - Fri and appointments are required.    [facility]  Ph. [phone]  Monday-Thursday 8 am - 7pm  Tues/Wed/Fri 8 am - 5:30 pm       "

## 2022-12-23 NOTE — PROGRESS NOTES
SUBJECTIVE:   CC: Priya is an 62 year old who presents for preventive health visit.     Patient has been advised of split billing requirements and indicates understanding: Yes  Healthy Habits:     Getting at least 3 servings of Calcium per day:  Yes    Bi-annual eye exam:  Yes    Dental care twice a year:  Yes    Sleep apnea or symptoms of sleep apnea:  None    Diet:  Regular (no restrictions)    Frequency of exercise:  6-7 days/week    Duration of exercise:  15-30 minutes    Taking medications regularly:  Not Applicable    Medication side effects:  Not applicable    PHQ-2 Total Score: 0    Additional concerns today:  Yes              Today's PHQ-2 Score:   PHQ-2 ( 1999 [employer]) 12/23/2022   Q1: Little interest or pleasure in doing things 0   Q2: Feeling down, depressed or hopeless 0   PHQ-2 Score 0   PHQ-2 Total Score (12-17 Years)- Positive if 3 or more points; Administer PHQ-A if positive -   Q1: Little interest or pleasure in doing things Not at all   Q2: Feeling down, depressed or hopeless Not at all   PHQ-2 Score 0       Social History     Tobacco Use     Smoking status: Never     Passive exposure: Never     Smokeless tobacco: Never   Substance Use Topics     Alcohol use: Yes     If you drink alcohol do you typically have >3 drinks per day or >7 drinks per week? Yes      Alcohol Use 12/23/2022   Prescreen: >3 drinks/day or >7 drinks/week? Yes   Prescreen: >3 drinks/day or >7 drinks/week? -     AUDIT - Alcohol Use Disorders Identification Test - Reproduced from the World Health Organization Audit 2001 (Second Edition) 12/23/2022   1.  How often do you have a drink containing alcohol? 4 or more times a week   2.  How many drinks containing alcohol do you have on a typical day when you are drinking? 1 or 2   3.  How often do you have five or more drinks on one occasion? Never   9.  Have you or someone else been injured because of your drinking? No   10. Has a relative, friend, doctor or other health care worker  been concerned about your drinking or suggested you cut down? No       Reviewed orders with patient.  Reviewed health maintenance and updated orders accordingly - Yes  Labs reviewed in EPIC    Breast Cancer Screening:    Breast CA Risk Assessment (FHS-7) 6/4/2021   Do you have a family history of breast, colon, or ovarian cancer? No / Unknown       click delete button to remove this line now  Mammogram Screening: Recommended annual mammography  Pertinent mammograms are reviewed under the imaging tab.    History of abnormal Pap smear: NO - age 30-65 PAP every 5 years with negative HPV co-testing recommended  PAP / HPV Latest Ref Rng & Units 2/9/2018   PAP (Historical) - NIL   HPV16 NEG:Negative Negative   HPV18 NEG:Negative Negative   HRHPV NEG:Negative Negative     Reviewed and updated as needed this visit by clinical staff   Tobacco  Allergies  Meds              Reviewed and updated as needed this visit by Provider                     Review of Systems   Constitutional: Negative for chills and fever.   HENT: Positive for hearing loss. Negative for congestion, ear pain and sore throat.    Eyes: Negative for pain and visual disturbance.   Respiratory: Negative for cough and shortness of breath.    Cardiovascular: Negative for chest pain, palpitations and peripheral edema.   Gastrointestinal: Negative for abdominal pain, constipation, diarrhea, heartburn, hematochezia and nausea.   Breasts:  Negative for tenderness, breast mass and discharge.   Genitourinary: Negative for dysuria, frequency, genital sores, hematuria, pelvic pain, urgency, vaginal bleeding and vaginal discharge.   Musculoskeletal: Negative for arthralgias, joint swelling and myalgias.   Skin: Positive for rash.   Neurological: Positive for dizziness. Negative for weakness, headaches and paresthesias.   Psychiatric/Behavioral: Negative for mood changes. The patient is not nervous/anxious.      Hearing loss, vertigo and tinnitus.     Sister and mom  "both have meniere's disease.   She has tried home treatment for the vertigo with variable success.   The ringing started this summer.   Hearing loss  - difficulty in busy/noisy places       OBJECTIVE:   /64   Pulse 97   Temp 97.8  F (36.6  C) (Tympanic)   Resp 16   Ht 1.651 m (5' 5\")   Wt 74.7 kg (164 lb 9.6 oz)   SpO2 100%   BMI 27.39 kg/m    Physical Exam  GENERAL: healthy, alert and no distress  EYES: Eyes grossly normal to inspection, PERRL and conjunctivae and sclerae normal  HENT: normal cephalic/atraumatic, right ear: clear effusion, left ear: normal: no effusions, no erythema, normal landmarks, nose and mouth without ulcers or lesions, oropharynx clear and oral mucous membranes moist  NECK: no adenopathy, no asymmetry, masses, or scars and thyroid normal to palpation  RESP: lungs clear to auscultation - no rales, rhonchi or wheezes  CV: regular rate and rhythm, normal S1 S2, no S3 or S4, no murmur, click or rub, no peripheral edema and peripheral pulses strong  ABDOMEN: soft, nontender, no hepatosplenomegaly, no masses and bowel sounds normal  MS: no gross musculoskeletal defects noted, no edema  SKIN: no suspicious lesions or rashes  NEURO: Normal strength and tone, mentation intact and speech normal  PSYCH: mentation appears normal, affect normal/bright        ASSESSMENT/PLAN:   (Z00.00) Routine general medical examination at a health care facility  (primary encounter diagnosis)  Comment:    Plan:      (Z12.4) Screening for cervical cancer  Comment:    Plan: Pap screen with HPV - recommended age 30 - 65         years             (H91.90) Hearing loss, unspecified hearing loss type, unspecified laterality  Comment: ?meniere's disease   Will have her see audiology and ent  Plan: Adult Audiology  Referral, Adult ENT          Referral             (H93.13) Tinnitus, bilateral  Comment: as above  Plan: Adult Audiology  Referral, Adult ENT          Referral         "     (L30.9) Dermatitis  Comment: bilateral inner thighs, seems to happen every fall when she starts wearing jeans again. Doesn't have a fungal look but asked her to let me know if it worsens with topical steroid  Also recommend good emollients.   Plan: triamcinolone (KENALOG) 0.1 % external cream               Patient has been advised of split billing requirements and indicates understanding: Yes      COUNSELING:  Reviewed preventive health counseling, as reflected in patient instructions       Regular exercise       Healthy diet/nutrition        She reports that she has never smoked. She has never been exposed to tobacco smoke. She has never used smokeless tobacco.      Manisha Han MD  [facility]

## 2022-12-28 LAB
BKR LAB AP GYN ADEQUACY: NORMAL
BKR LAB AP GYN INTERPRETATION: NORMAL
BKR LAB AP HPV REFLEX: NORMAL
BKR LAB AP PREVIOUS ABNORMAL: NORMAL
PATH REPORT.COMMENTS IMP SPEC: NORMAL
PATH REPORT.COMMENTS IMP SPEC: NORMAL
PATH REPORT.RELEVANT HX SPEC: NORMAL

## 2022-12-29 LAB
HUMAN PAPILLOMA VIRUS 16 DNA: NEGATIVE
HUMAN PAPILLOMA VIRUS 18 DNA: NEGATIVE
HUMAN PAPILLOMA VIRUS FINAL DIAGNOSIS: NORMAL
HUMAN PAPILLOMA VIRUS OTHER HR: NEGATIVE

## 2023-01-23 ENCOUNTER — E-VISIT (OUTPATIENT)
Dept: FAMILY MEDICINE | Facility: CLINIC | Age: 63
End: 2023-01-23
Payer: COMMERCIAL

## 2023-01-23 DIAGNOSIS — J01.90 ACUTE BACTERIAL SINUSITIS: Primary | ICD-10-CM

## 2023-01-23 DIAGNOSIS — B96.89 ACUTE BACTERIAL SINUSITIS: Primary | ICD-10-CM

## 2023-01-23 PROCEDURE — 99421 OL DIG E/M SVC 5-10 MIN: CPT | Performed by: FAMILY MEDICINE

## 2023-01-23 RX ORDER — DOXYCYCLINE HYCLATE 100 MG
100 TABLET ORAL 2 TIMES DAILY
Qty: 14 TABLET | Refills: 0 | Status: SHIPPED | OUTPATIENT
Start: 2023-01-23 | End: 2023-01-30

## 2023-01-23 NOTE — PATIENT INSTRUCTIONS
Dear Priya,     After reviewing your responses, I've been able to diagnose you with a possible sinus infection.     Generally we do not prescribe antibiotics unless symptoms are lasting 10-14 days and worsening.  Sounds like it hasn't been quite that long yet.  I will send a prescription but would recommend you wait out this week and see what happens.     Antibiotics (if they are not necessary) increase the risk of antibiotic associated diarrhea/C diff diarrhea.  95% of the time, these infections are caused by a virus and will resolve on their own given time.      It is also important to stay well hydrated, get lots of rest and take over-the-counter decongestants,?tylenol?or ibuprofen if you?are able to?take those medications per your primary care provider to help relieve discomfort.?     It is important that you take?all of?your prescribed medication even if your symptoms are improving after a few doses.? Taking?all of?your medicine helps prevent the symptoms from returning.?     If your symptoms worsen, you develop severe headache, vomiting, high fever (>102), or are not improving in 7 days, please contact your primary care provider for an appointment or visit any of our convenient Walk-in Care or Urgent Care Centers to be seen which can be found on our website?here.?     Thanks again for choosing?us?as your health care partner,?   ?  Manisha Han MD?

## 2023-02-22 ENCOUNTER — TELEPHONE (OUTPATIENT)
Dept: FAMILY MEDICINE | Facility: CLINIC | Age: 63
End: 2023-02-22
Payer: COMMERCIAL

## 2023-02-22 ENCOUNTER — MYC MEDICAL ADVICE (OUTPATIENT)
Dept: FAMILY MEDICINE | Facility: CLINIC | Age: 63
End: 2023-02-22

## 2023-02-22 NOTE — TELEPHONE ENCOUNTER
Patient Quality Outreach    Patient is due for the following:   Colonoscopy    Next Steps:   - Complete colonoscopy  - Update covid vaccine    Type of outreach:    Sent [employer] message.      Questions for provider review:    None     Irene Hightower, CMA

## 2023-03-17 NOTE — PROGRESS NOTES
Chief Complaint   Patient presents with     Suspected Hearing Decrease     Gradual hearing loss over the last 2 years /audio     History of Present Illness   Priya Krishnan is a 62 year old female who presents to me today for ear evaluation.  I am seeing this patient in consultation for unspecified hearing loss at the request of the provider Dr. Han. The patient reports gradual decreased hearing and more prominent tinnitus for the last several years.  It was gradual in onset.  The patient has noticed increased difficulty hearing certain sounds and difficulty in understanding others, especially in noisy or crowded situations.  There is no history of recent head trauma, or chronic ear disease or ear surgery.  The patient denies significant recreational, , and work-related noise exposure.  No family history of hearing loss at a young age. The patient does report episodes of intermittent unsteadiness/imbalance or dizziness that seems to happen with head position change.  She reports symptoms when she is in extreme head extension and then gets up too quickly.  Symptoms will last for minutes.  She denies tom room spinning vertigo.  She will sometimes have some nausea with the episodes.  She currently denies any otalgia, otorrhea, bloody otorrhea.      Past Medical History  Patient Active Problem List   Diagnosis     Phobia     Current Medications   No current outpatient medications on file.    Allergies  Allergies   Allergen Reactions     Amoxicillin      blisters       Social History   Social History     Socioeconomic History     Marital status:    Tobacco Use     Smoking status: Never     Passive exposure: Never     Smokeless tobacco: Never   Vaping Use     Vaping Use: Never used   Substance and Sexual Activity     Alcohol use: Yes     Drug use: No     Sexual activity: Yes     Partners: Male       Family History  Family History   Problem Relation Age of Onset     Dementia Mother       Cerebrovascular Disease Mother      Heart Disease Father      Asthma Son        Review of Systems  As per HPI and PMHx, otherwise 10+ comprehensive system review is negative.    Physical Exam  /74 (BP Location: Right arm, Patient Position: Chair, Cuff Size: Adult Regular)   Pulse 88   Temp 98.2  F (36.8  C) (Tympanic)   Wt 73.9 kg (163 lb)   BMI 27.12 kg/m    GENERAL: Patient is a pleasant, cooperative 62 year old female in no acute distress.  HEAD: Normocephalic, atraumatic.  Hair and scalp are normal.  EYES: Pupils are equal, round, reactive to light and accommodation.  Extraocular movements are intact.  The sclera nonicteric without injection.  The extraocular structures are normal.  EARS: Normal shape and symmetry.  No tenderness when palpating the mastoid or tragal areas bilaterally.  Otoscopic exam reveals a minimal amount of cerumen bilaterally.  The bilateral tympanic membranes are round, intact without evidence of effusion, good landmarks.  No retraction, granulation, or drainage.  NOSE: Nares are patent.  Nasal mucosa is pink and moist.  Negative anterior rhinoscopy.  NEUROLOGIC: Cranial nerves II through XII are grossly intact.  Voice is strong.  Patient is House-Brackmann I/VI bilaterally.  CARDIOVASCULAR: Extremities are warm and well-perfused.  No significant peripheral edema.  RESPIRATORY: Patient has nonlabored breathing without cough, wheeze, stridor.  PSYCHIATRIC: Patient is alert and oriented.  Mood and affect appear normal.  SKIN: Warm and dry.  No scalp, face, or neck lesions noted.    Audiogram  The patient underwent an audiogram performed today.  My review of the audiogram shows normal hearing to 1000 Hz sloping to mild sloping to moderate sensorineural hearing loss in the right ear and mild sloping to moderate sensorineural hearing loss in the left ear.  Pure-tone average is 13 dB on the right and 28 dB on the left.  Speech reception threshold is 10 dB on the right and 30 dB on the  left.  The patient had 96% word recognition at 65 dB above hearing level on the right and 80% word recognition 35 dB above hearing level on the left.  The patient had a type A deep tympanogram on the right and a type A tympanogram on the left.  Acoustic reflexes were present ipsilaterally and contralaterally in both ears.    Assessment and Plan     ICD-10-CM    1. Sensorineural hearing loss, bilateral  H90.3 Adult ENT  Referral      2. Asymmetrical sensorineural hearing loss  H90.3       3. Tinnitus, bilateral  H93.13 Adult ENT  Referral        It was my pleasure seeing Priya Krishnan today in clinic.  The patient presents today with bilateral sensorineural hearing loss greater on the left-hand side.  She would not meet MRI criteria based on her hearing alone.  She is concerned about her episodes of dizziness/imbalance.  In talking with her from a symptom standpoint, this sounds like it might be cervical spine related or possibly orthostasis.  She is not really having signs or symptoms that are consistent with tom positional vertigo.  I did offer her an MRI to rule out any retrocochlear issues and intracranial issues.  She will think about this as an option.  I placed a referral for vestibular evaluation, I will have her meet with the vestibular therapists to see if they have anything to offer.  We might have to pursue seeing primary care to see if they would have recommendations from a cervical spine standpoint.  We spent the remainder of today's visit on education. We discussed hearing protection in noisy environments.    I do think her tinnitus is largely affected by her hearing loss.  She could try hearing aids to see if that would also help her ringing. The patient is medically cleared for consideration of a hearing aid evaluation.    The patient will follow up as necessary for worsening symptoms or changes in symptoms. I have also recommended repeat audiogram in 3-5 years, or sooner if  symptoms warrant.      Abelardo Wilson MD  Department of Otolaryngology-Head and Neck Surgery  [facility]

## 2023-03-20 ENCOUNTER — OFFICE VISIT (OUTPATIENT)
Dept: OTOLARYNGOLOGY | Facility: CLINIC | Age: 63
End: 2023-03-20
Attending: FAMILY MEDICINE
Payer: COMMERCIAL

## 2023-03-20 ENCOUNTER — OFFICE VISIT (OUTPATIENT)
Dept: AUDIOLOGY | Facility: CLINIC | Age: 63
End: 2023-03-20
Attending: FAMILY MEDICINE
Payer: COMMERCIAL

## 2023-03-20 VITALS
DIASTOLIC BLOOD PRESSURE: 74 MMHG | SYSTOLIC BLOOD PRESSURE: 110 MMHG | WEIGHT: 163 LBS | HEART RATE: 88 BPM | TEMPERATURE: 98.2 F | BODY MASS INDEX: 27.12 KG/M2

## 2023-03-20 DIAGNOSIS — H91.90 HEARING LOSS, UNSPECIFIED HEARING LOSS TYPE, UNSPECIFIED LATERALITY: ICD-10-CM

## 2023-03-20 DIAGNOSIS — H90.3 SENSORINEURAL HEARING LOSS, BILATERAL: ICD-10-CM

## 2023-03-20 DIAGNOSIS — H90.3 ASYMMETRICAL SENSORINEURAL HEARING LOSS: ICD-10-CM

## 2023-03-20 DIAGNOSIS — H93.13 TINNITUS, BILATERAL: ICD-10-CM

## 2023-03-20 DIAGNOSIS — H90.3 SENSORINEURAL HEARING LOSS, BILATERAL: Primary | ICD-10-CM

## 2023-03-20 PROCEDURE — 99207 PR NO CHARGE LOS: CPT | Performed by: AUDIOLOGIST

## 2023-03-20 PROCEDURE — 99243 OFF/OP CNSLTJ NEW/EST LOW 30: CPT | Performed by: OTOLARYNGOLOGY

## 2023-03-20 PROCEDURE — 92550 TYMPANOMETRY & REFLEX THRESH: CPT | Performed by: AUDIOLOGIST

## 2023-03-20 PROCEDURE — 92557 COMPREHENSIVE HEARING TEST: CPT | Performed by: AUDIOLOGIST

## 2023-03-20 ASSESSMENT — PAIN SCALES - GENERAL: PAINLEVEL: NO PAIN (0)

## 2023-03-20 NOTE — NURSING NOTE
"Initial /74 (BP Location: Right arm, Patient Position: Chair, Cuff Size: Adult Regular)   Pulse 72   Temp 98.2  F (36.8  C) (Tympanic)   Wt 73.9 kg (163 lb)   BMI 27.12 kg/m   Estimated body mass index is 27.12 kg/m  as calculated from the following:    Height as of 12/23/22: 1.651 m (5' 5\").    Weight as of this encounter: 73.9 kg (163 lb). .    Bruna Devi LPN    "

## 2023-03-20 NOTE — PROGRESS NOTES
AUDIOLOGY REPORT:    Patient was referred from ENT by Dr. Wilson for audiology evaluation. The patient reports gradually worsening hearing, though she has noted more difficulty in the last year. The patient reports that her hearing is muffled. She is unsure if one ear is worse than the other. The patient reports bilateral tinnitus that varies in how noticeable it is. It is worse in the morning. The patient reports a family history of hearing loss and ear problems  including her father (deaf in one ear), sister (Meniere's disease), mother (vertigo), and two brothers (hearing loss). The patient reports that she does not have ear pain, ear pressure, history of ear problems or ear surgery, or history of loud noise exposure.    Testing:    Otoscopy:   Otoscopic exam indicates ears are clear of cerumen bilaterally     Tympanograms:    RIGHT: normal eardrum mobility     LEFT:   normal eardrum mobility    Reflexes (reported by stimulus ear):  RIGHT: Ipsilateral is present at normal levels  RIGHT: Contralateral is present at normal levels  LEFT:   Ipsilateral is present at normal levels  LEFT:   Contralateral is present at normal levels    Thresholds:   Pure Tone Thresholds assessed using conventional audiometry with good reliability from 250-8000 Hz bilaterally using insert earphones and circumaural headphones     RIGHT:  normal hearing sensitivity through 2000 Hz sloping to mild to moderate sensorineural hearing loss    LEFT:    mild to moderately severe sensorineural hearing loss with normal hearing sensitivity at 1000 Hz    Speech Reception Threshold:    RIGHT: 10 dB HL    LEFT:   30 dB HL  Results are in agreement with pure tone average.     Word Recognition Score:     RIGHT: 96% at 65 dB HL using NU-6 recorded word list.    LEFT:   76% at 70 dB HL using NU-6 recorded word list.      80% at 75 dB HL using NU-6 recorded word list.    Discussed results with the patient.     Patient was returned to ENT for follow up.      Shon Wells, CCC-A  Licensed Audiologist #[id number]  3/20/2023

## 2023-03-20 NOTE — LETTER
3/20/2023         RE: Pryia Krishnan  [address]  [facility] [address]        Dear Colleague,    Thank you for referring your patient, Priya Krishnan, to the [facility]. Please see a copy of my visit note below.    Chief Complaint   Patient presents with     Suspected Hearing Decrease     Gradual hearing loss over the last 2 years /audio     History of Present Illness   Priya Krishnan is a 62 year old female who presents to me today for ear evaluation.  I am seeing this patient in consultation for unspecified hearing loss at the request of the provider Dr. Han. The patient reports gradual decreased hearing and more prominent tinnitus for the last several years.  It was gradual in onset.  The patient has noticed increased difficulty hearing certain sounds and difficulty in understanding others, especially in noisy or crowded situations.  There is no history of recent head trauma, or chronic ear disease or ear surgery.  The patient denies significant recreational, , and work-related noise exposure.  No family history of hearing loss at a young age. The patient does report episodes of intermittent unsteadiness/imbalance or dizziness that seems to happen with head position change.  She reports symptoms when she is in extreme head extension and then gets up too quickly.  Symptoms will last for minutes.  She denies tom room spinning vertigo.  She will sometimes have some nausea with the episodes.  She currently denies any otalgia, otorrhea, bloody otorrhea.      Past Medical History  Patient Active Problem List   Diagnosis     Phobia     Current Medications   No current outpatient medications on file.    Allergies  Allergies   Allergen Reactions     Amoxicillin      blisters       Social History   Social History     Socioeconomic History     Marital status:    Tobacco Use     Smoking status: Never     Passive exposure: Never     Smokeless tobacco: Never   Vaping  Use     Vaping Use: Never used   Substance and Sexual Activity     Alcohol use: Yes     Drug use: No     Sexual activity: Yes     Partners: Male       Family History  Family History   Problem Relation Age of Onset     Dementia Mother      Cerebrovascular Disease Mother      Heart Disease Father      Asthma Son        Review of Systems  As per HPI and PMHx, otherwise 10+ comprehensive system review is negative.    Physical Exam  /74 (BP Location: Right arm, Patient Position: Chair, Cuff Size: Adult Regular)   Pulse 88   Temp 98.2  F (36.8  C) (Tympanic)   Wt 73.9 kg (163 lb)   BMI 27.12 kg/m    GENERAL: Patient is a pleasant, cooperative 62 year old female in no acute distress.  HEAD: Normocephalic, atraumatic.  Hair and scalp are normal.  EYES: Pupils are equal, round, reactive to light and accommodation.  Extraocular movements are intact.  The sclera nonicteric without injection.  The extraocular structures are normal.  EARS: Normal shape and symmetry.  No tenderness when palpating the mastoid or tragal areas bilaterally.  Otoscopic exam reveals a minimal amount of cerumen bilaterally.  The bilateral tympanic membranes are round, intact without evidence of effusion, good landmarks.  No retraction, granulation, or drainage.  NOSE: Nares are patent.  Nasal mucosa is pink and moist.  Negative anterior rhinoscopy.  NEUROLOGIC: Cranial nerves II through XII are grossly intact.  Voice is strong.  Patient is House-Brackmann I/VI bilaterally.  CARDIOVASCULAR: Extremities are warm and well-perfused.  No significant peripheral edema.  RESPIRATORY: Patient has nonlabored breathing without cough, wheeze, stridor.  PSYCHIATRIC: Patient is alert and oriented.  Mood and affect appear normal.  SKIN: Warm and dry.  No scalp, face, or neck lesions noted.    Audiogram  The patient underwent an audiogram performed today.  My review of the audiogram shows normal hearing to 1000 Hz sloping to mild sloping to moderate  sensorineural hearing loss in the right ear and mild sloping to moderate sensorineural hearing loss in the left ear.  Pure-tone average is 13 dB on the right and 28 dB on the left.  Speech reception threshold is 10 dB on the right and 30 dB on the left.  The patient had 96% word recognition at 65 dB above hearing level on the right and 80% word recognition 35 dB above hearing level on the left.  The patient had a type A deep tympanogram on the right and a type A tympanogram on the left.  Acoustic reflexes were present ipsilaterally and contralaterally in both ears.    Assessment and Plan     ICD-10-CM    1. Sensorineural hearing loss, bilateral  H90.3 Adult ENT  Referral      2. Asymmetrical sensorineural hearing loss  H90.3       3. Tinnitus, bilateral  H93.13 Adult ENT  Referral        It was my pleasure seeing Priya Krishnan today in clinic.  The patient presents today with bilateral sensorineural hearing loss greater on the left-hand side.  She would not meet MRI criteria based on her hearing alone.  She is concerned about her episodes of dizziness/imbalance.  In talking with her from a symptom standpoint, this sounds like it might be cervical spine related or possibly orthostasis.  She is not really having signs or symptoms that are consistent with tom positional vertigo.  I did offer her an MRI to rule out any retrocochlear issues and intracranial issues.  She will think about this as an option.  I placed a referral for vestibular evaluation, I will have her meet with the vestibular therapists to see if they have anything to offer.  We might have to pursue seeing primary care to see if they would have recommendations from a cervical spine standpoint.  We spent the remainder of today's visit on education. We discussed hearing protection in noisy environments.    I do think her tinnitus is largely affected by her hearing loss.  She could try hearing aids to see if that would also help her  ringing. The patient is medically cleared for consideration of a hearing aid evaluation.    The patient will follow up as necessary for worsening symptoms or changes in symptoms. I have also recommended repeat audiogram in 3-5 years, or sooner if symptoms warrant.      Abelardo Wilson MD  Department of Otolaryngology-Head and Neck Surgery  [facility]         Again, thank you for allowing me to participate in the care of your patient.        Sincerely,        Abelardo Wilson MD

## 2023-04-11 ENCOUNTER — HOSPITAL ENCOUNTER (OUTPATIENT)
Dept: PHYSICAL THERAPY | Facility: CLINIC | Age: 63
Setting detail: THERAPIES SERIES
Discharge: HOME | End: 2023-04-11
Attending: OTOLARYNGOLOGY
Payer: COMMERCIAL

## 2023-04-11 DIAGNOSIS — H90.3 ASYMMETRICAL SENSORINEURAL HEARING LOSS: ICD-10-CM

## 2023-04-11 DIAGNOSIS — H93.13 TINNITUS, BILATERAL: ICD-10-CM

## 2023-04-11 DIAGNOSIS — H90.3 SENSORINEURAL HEARING LOSS, BILATERAL: ICD-10-CM

## 2023-04-11 PROCEDURE — 95992 CANALITH REPOSITIONING PROC: CPT | Mod: GP

## 2023-04-11 PROCEDURE — 97161 PT EVAL LOW COMPLEX 20 MIN: CPT | Mod: GP

## 2023-04-11 NOTE — PROGRESS NOTES
04/11/23 1600   Quick Adds   Quick Adds Vestibular Eval   General Information   Start of Care Date 04/11/23   Referring Physician Abelardo Wilson MD   Orders Evaluate and Treat as Indicated   Order Date 03/20/23   Medical Diagnosis Sensorineural hearing loss, bilateral  Tinnitus, bilateral  Asymmetrical sensorineural hearing loss   Onset of illness/injury or Date of Surgery 10/13/21   Surgical/Medical history reviewed Yes  (na)   Pertinent history of current vestibular problem (include personal factors and/or comorbidities that impact the POC)  Hearing loss;Motion sickness;Prior concussion(s)  (hit head on ground in 2019)   Hearing Loss Comments in process of getting hearing aids   Pertinent history of current problem (include personal factors and/or comorbidities that impact the POC) Pt reports she has been having dizziness with sit ups, neck ext at hairdressers, certain yoga poses (downward dog) for the last year and a half. Episodes last hours.  Pt has ringing in ears. Sometimes she can't tell the difference between ringing and dizziness. Sometimes rolling in bed makes it worse.   Pertinent Visual History  corrective lenses   Prior level of function comment independent   Current Community Support Family/friend caregiver   Patient role/Employment history Employed  (para in education)   Living environment [place]   Patient/Family Goals Statement would like to find out cause and get rid of dizziness   Fall Risk Screen   Fall screen completed by PT   Have you fallen 2 or more times in the past year? No   Have you fallen and had an injury in the past year? No   Is patient a fall risk? No   Abuse Screen (yes response referral indicated)   Feels Unsafe at Home or Work/School no   Feels Threatened by Someone no   Does Anyone Try to Keep You From Having Contact with Others or Doing Things Outside Your Home? no   Physical Signs of Abuse Present no   System Outcome Measures   Outcome Measures BPPV   Dizziness  Handicap Inventory (score out of 100) A decrease in score by 17.18 or greater indicates a clinically significant change in symptoms. 14   Pain   Patient currently in pain No   Cervicogenic Screen   Neck ROM WFL with incr mild dizziness returning from ext   Oculomotor Exam   Smooth Pursuit Normal   Saccades Normal   Saccades Comments pt reports incr dizziness vertically and horizontally   VOR Normal   Rapid Head Thrust Normal   Convergence Testing Normal   Convergence Testing Comments normal with L dominant eye, R eye remains in neutral   Infrared Goggle Exam or Frenzel Lense Exam   Vestibular Suppressant in Last 24 Hours? No   Exam completed with Infrared Goggles   Spontaneous Nystagmus Negative   Gaze Evoked Nystagmus Negative   Head Shake Horizontal Nystagmus Negative   Robert-Hallpike (right) Negative   Robert-Hallpike (right) comments Pt reports dizziness   Robert-Hallpike (Left) Negative   HSCC Supine Roll Test (Right) Negative   HSCC Supine Roll Test (Left) Negative   BPPV Canal(s) R Posterior   BPPV Type Canalithasis   Planned Therapy Interventions   Planned Therapy Interventions   (CRT)   Clinical Impression   Criteria for Skilled Therapeutic Interventions Met yes, treatment indicated   PT Diagnosis R post canal BPPV   Influenced by the following impairments dizziness, nausea   Functional limitations due to impairments looking up, sitting up quickly, rolling in bed   Clinical Presentation Stable/Uncomplicated   Clinical Presentation Rationale clinical judgement   Clinical Decision Making (Complexity) Low complexity   Therapy Frequency 1 time/week   Predicted Duration of Therapy Intervention (days/wks) 4 weeks   Risk & Benefits of therapy have been explained Yes   Patient, Family & other staff in agreement with plan of care Yes   Education Assessment   Preferred Learning Style Listening;Demonstration   Barriers to Learning No barriers   GOALS   PT Eval Goals 1   Goal 1   Goal Identifier 1   Goal Description Pt will  report min to no dizziness withlooking up, sitting up quickly, rolling in bed.   Target Date 05/09/23   Total Evaluation Time   PT Eval, Low Complexity Minutes (84434) 30     Deepa Pierson PT

## 2023-05-25 ENCOUNTER — LAB (OUTPATIENT)
Dept: LAB | Facility: CLINIC | Age: 63
End: 2023-05-25
Payer: COMMERCIAL

## 2023-05-25 DIAGNOSIS — L65.9 LOSS OF HAIR: Primary | ICD-10-CM

## 2023-05-25 LAB
BASOPHILS # BLD AUTO: 0.1 10E3/UL (ref 0–0.2)
BASOPHILS NFR BLD AUTO: 1 %
EOSINOPHIL # BLD AUTO: 0.5 10E3/UL (ref 0–0.7)
EOSINOPHIL NFR BLD AUTO: 8 %
ERYTHROCYTE [DISTWIDTH] IN BLOOD BY AUTOMATED COUNT: 13.5 % (ref 10–15)
FERRITIN SERPL-MCNC: 48 NG/ML (ref 11–328)
HCT VFR BLD AUTO: 39.6 % (ref 35–47)
HGB BLD-MCNC: 12.8 G/DL (ref 11.7–15.7)
IMM GRANULOCYTES # BLD: 0 10E3/UL
IMM GRANULOCYTES NFR BLD: 0 %
IRON BINDING CAPACITY (ROCHE): 333 UG/DL (ref 240–430)
IRON SATN MFR SERPL: 14 % (ref 15–46)
IRON SERPL-MCNC: 48 UG/DL (ref 37–145)
LYMPHOCYTES # BLD AUTO: 1.7 10E3/UL (ref 0.8–5.3)
LYMPHOCYTES NFR BLD AUTO: 30 %
MCH RBC QN AUTO: 29 PG (ref 26.5–33)
MCHC RBC AUTO-ENTMCNC: 32.3 G/DL (ref 31.5–36.5)
MCV RBC AUTO: 90 FL (ref 78–100)
MONOCYTES # BLD AUTO: 0.5 10E3/UL (ref 0–1.3)
MONOCYTES NFR BLD AUTO: 8 %
NEUTROPHILS # BLD AUTO: 3 10E3/UL (ref 1.6–8.3)
NEUTROPHILS NFR BLD AUTO: 53 %
PLATELET # BLD AUTO: 202 10E3/UL (ref 150–450)
RBC # BLD AUTO: 4.42 10E6/UL (ref 3.8–5.2)
T4 FREE SERPL-MCNC: 1.1 NG/DL (ref 0.9–1.7)
TSH SERPL DL<=0.005 MIU/L-ACNC: 2.07 UIU/ML (ref 0.3–4.2)
WBC # BLD AUTO: 5.7 10E3/UL (ref 4–11)

## 2023-05-25 PROCEDURE — 84443 ASSAY THYROID STIM HORMONE: CPT

## 2023-05-25 PROCEDURE — 36415 COLL VENOUS BLD VENIPUNCTURE: CPT

## 2023-05-25 PROCEDURE — 85025 COMPLETE CBC W/AUTO DIFF WBC: CPT

## 2023-05-25 PROCEDURE — 82306 VITAMIN D 25 HYDROXY: CPT

## 2023-05-25 PROCEDURE — 83550 IRON BINDING TEST: CPT

## 2023-05-25 PROCEDURE — 83540 ASSAY OF IRON: CPT

## 2023-05-25 PROCEDURE — 84439 ASSAY OF FREE THYROXINE: CPT

## 2023-05-25 PROCEDURE — 82728 ASSAY OF FERRITIN: CPT

## 2023-05-25 NOTE — PROGRESS NOTES
DISCHARGE  Reason for Discharge: Patient has failed to schedule further appointments.    Discharge Plan: Pt cancelled last 2 scheduled appts. Will assume her dizziness resolved. No further PT needed at this time. Will discharge PT.    Referring Provider:  Abelardo Wilson

## 2023-05-26 LAB — DEPRECATED CALCIDIOL+CALCIFEROL SERPL-MC: 32 UG/L (ref 20–75)

## 2023-09-25 ENCOUNTER — OFFICE VISIT (OUTPATIENT)
Dept: FAMILY MEDICINE | Facility: CLINIC | Age: 63
End: 2023-09-25
Payer: COMMERCIAL

## 2023-09-25 ENCOUNTER — ANCILLARY PROCEDURE (OUTPATIENT)
Dept: GENERAL RADIOLOGY | Facility: CLINIC | Age: 63
End: 2023-09-25
Attending: NURSE PRACTITIONER
Payer: COMMERCIAL

## 2023-09-25 VITALS
RESPIRATION RATE: 20 BRPM | WEIGHT: 154.5 LBS | TEMPERATURE: 98.5 F | HEART RATE: 78 BPM | SYSTOLIC BLOOD PRESSURE: 112 MMHG | BODY MASS INDEX: 25.74 KG/M2 | DIASTOLIC BLOOD PRESSURE: 88 MMHG | HEIGHT: 65 IN | OXYGEN SATURATION: 98 %

## 2023-09-25 DIAGNOSIS — R05.1 ACUTE COUGH: Primary | ICD-10-CM

## 2023-09-25 DIAGNOSIS — R05.1 ACUTE COUGH: ICD-10-CM

## 2023-09-25 PROCEDURE — 99213 OFFICE O/P EST LOW 20 MIN: CPT | Performed by: NURSE PRACTITIONER

## 2023-09-25 PROCEDURE — 71046 X-RAY EXAM CHEST 2 VIEWS: CPT | Mod: TC | Performed by: RADIOLOGY

## 2023-09-25 RX ORDER — MUPIROCIN 20 MG/G
OINTMENT TOPICAL DAILY
COMMUNITY
Start: 2023-09-12 | End: 2023-10-26

## 2023-09-25 RX ORDER — SULFAMETHOXAZOLE/TRIMETHOPRIM 800-160 MG
1 TABLET ORAL
COMMUNITY
Start: 2023-09-19 | End: 2023-10-17

## 2023-09-25 RX ORDER — ALBUTEROL SULFATE 90 UG/1
2 AEROSOL, METERED RESPIRATORY (INHALATION) EVERY 6 HOURS PRN
Qty: 18 G | Refills: 0 | Status: SHIPPED | OUTPATIENT
Start: 2023-09-25 | End: 2023-10-23

## 2023-09-25 RX ORDER — BENZONATATE 100 MG/1
100 CAPSULE ORAL 3 TIMES DAILY PRN
Qty: 30 CAPSULE | Refills: 0 | Status: SHIPPED | OUTPATIENT
Start: 2023-09-25 | End: 2023-10-17

## 2023-09-25 ASSESSMENT — PAIN SCALES - GENERAL: PAINLEVEL: NO PAIN (0)

## 2023-09-25 ASSESSMENT — ENCOUNTER SYMPTOMS: COUGH: 1

## 2023-09-25 NOTE — PROGRESS NOTES
Assessment & Plan     Acute cough  Likely viral.  CXR negative.  Has been on doxy and bactrim this month.  COVID testing at home negative.  Symptomatic treatments with albuterol and tessalon  Follow up in one week if no improvement.    - XR Chest 2 Views; Future  - albuterol (PROAIR HFA/PROVENTIL HFA/VENTOLIN HFA) 108 (90 Base) MCG/ACT inhaler; Inhale 2 puffs into the lungs every 6 hours as needed for shortness of breath, wheezing or cough  - benzonatate (TESSALON) 100 MG capsule; Take 1 capsule (100 mg) by mouth 3 times daily as needed for cough      The risks, benefits and treatment options of prescribed medications or other treatments have been discussed with the patient. The patient verbalized their understanding and should call or follow up if no improvement or if they develop further problems.  HILARY Romero CNP [facility]              Nadiya Powers is a 63 year old, presenting for the following health issues:  Cough        9/25/2023     3:22 PM   Additional Questions   Roomed by Chitra MARTIN       History of Present Illness       Reason for visit:  Chest cold  Symptom onset:  1-2 weeks ago  Symptoms include:  Coughing  Symptom intensity:  Moderate  Symptom progression:  Staying the same    She eats 2-3 servings of fruits and vegetables daily.She consumes 0 sweetened beverage(s) daily.She exercises with enough effort to increase her heart rate 20 to 29 minutes per day.  She exercises with enough effort to increase her heart rate 7 days per week.   She is taking medications regularly.       Acute Illness  Acute illness concerns: Cough   Onset/Duration: 2 weeks   Symptoms:  Fever: YES- 100.2  Chills/Sweats: No  Headache (location?): YES  Sinus Pressure: YES  Conjunctivitis:  No  Ear Pain: no  Rhinorrhea: YES  Congestion: YES  Sore Throat: No  Cough: YES-states heaviness in chest  and productive cough unknown color   Wheeze: YES  Decreased Appetite: YES  Nausea: No  Vomiting:  "No  Diarrhea: No  Dysuria/Freq.: No  Dysuria or Hematuria: No  Fatigue/Achiness: YES- fatigue   Sick/Strep Exposure: YES  Therapies tried and outcome: patient was on doxycycline for wound infection for 15 days and is currently taking Bactrim for the wound infection   Albuterol is helpful    ** Tested negative 2 times for Covid               Review of Systems   Respiratory:  Positive for cough.       Constitutional, HEENT, cardiovascular, pulmonary, gi and gu systems are negative, except as otherwise noted.      Objective    /88   Pulse 78   Temp 98.5  F (36.9  C) (Tympanic)   Resp 20   Ht 1.651 m (5' 5\")   Wt 70.1 kg (154 lb 8 oz)   SpO2 98%   BMI 25.71 kg/m    Body mass index is 25.71 kg/m .  Physical Exam   GENERAL: healthy, alert and no distress  HENT: ear canals and TM's normal, nose and mouth without ulcers or lesions  NECK: no adenopathy, no asymmetry, masses, or scars and thyroid normal to palpation  RESP: lungs clear to auscultation - no rales, rhonchi or wheezes  CV: regular rate and rhythm, normal S1 S2, no S3 or S4, no murmur, click or rub, no peripheral edema and peripheral pulses strong    Xray - Reviewed and interpreted by me.  negative                  "

## 2023-10-17 ENCOUNTER — OFFICE VISIT (OUTPATIENT)
Dept: FAMILY MEDICINE | Facility: CLINIC | Age: 63
End: 2023-10-17
Payer: COMMERCIAL

## 2023-10-17 ENCOUNTER — ANCILLARY PROCEDURE (OUTPATIENT)
Dept: GENERAL RADIOLOGY | Facility: CLINIC | Age: 63
End: 2023-10-17
Attending: STUDENT IN AN ORGANIZED HEALTH CARE EDUCATION/TRAINING PROGRAM
Payer: COMMERCIAL

## 2023-10-17 VITALS
WEIGHT: 154 LBS | DIASTOLIC BLOOD PRESSURE: 80 MMHG | TEMPERATURE: 98.6 F | HEIGHT: 65 IN | HEART RATE: 90 BPM | SYSTOLIC BLOOD PRESSURE: 120 MMHG | RESPIRATION RATE: 20 BRPM | OXYGEN SATURATION: 99 % | BODY MASS INDEX: 25.66 KG/M2

## 2023-10-17 DIAGNOSIS — R05.2 SUBACUTE COUGH: Primary | ICD-10-CM

## 2023-10-17 DIAGNOSIS — R05.2 SUBACUTE COUGH: ICD-10-CM

## 2023-10-17 DIAGNOSIS — R50.9 FEVER, UNSPECIFIED FEVER CAUSE: ICD-10-CM

## 2023-10-17 LAB

## 2023-10-17 PROCEDURE — 87040 BLOOD CULTURE FOR BACTERIA: CPT | Mod: 59 | Performed by: STUDENT IN AN ORGANIZED HEALTH CARE EDUCATION/TRAINING PROGRAM

## 2023-10-17 PROCEDURE — 87633 RESP VIRUS 12-25 TARGETS: CPT | Performed by: STUDENT IN AN ORGANIZED HEALTH CARE EDUCATION/TRAINING PROGRAM

## 2023-10-17 PROCEDURE — 87581 M.PNEUMON DNA AMP PROBE: CPT | Performed by: STUDENT IN AN ORGANIZED HEALTH CARE EDUCATION/TRAINING PROGRAM

## 2023-10-17 PROCEDURE — 87486 CHLMYD PNEUM DNA AMP PROBE: CPT | Performed by: STUDENT IN AN ORGANIZED HEALTH CARE EDUCATION/TRAINING PROGRAM

## 2023-10-17 PROCEDURE — 71046 X-RAY EXAM CHEST 2 VIEWS: CPT | Mod: TC | Performed by: RADIOLOGY

## 2023-10-17 PROCEDURE — 99214 OFFICE O/P EST MOD 30 MIN: CPT | Performed by: STUDENT IN AN ORGANIZED HEALTH CARE EDUCATION/TRAINING PROGRAM

## 2023-10-17 PROCEDURE — 36415 COLL VENOUS BLD VENIPUNCTURE: CPT | Performed by: STUDENT IN AN ORGANIZED HEALTH CARE EDUCATION/TRAINING PROGRAM

## 2023-10-17 RX ORDER — LEVOFLOXACIN 750 MG/1
750 TABLET, FILM COATED ORAL DAILY
Qty: 5 TABLET | Refills: 0 | Status: SHIPPED | OUTPATIENT
Start: 2023-10-17 | End: 2023-10-22

## 2023-10-17 RX ORDER — DEXTROMETHORPHAN POLISTIREX 30 MG/5ML
60 SUSPENSION ORAL 2 TIMES DAILY
Qty: 148 ML | Refills: 0 | Status: SHIPPED | OUTPATIENT
Start: 2023-10-17 | End: 2023-10-26

## 2023-10-17 RX ORDER — AZITHROMYCIN 250 MG/1
TABLET, FILM COATED ORAL
Qty: 6 TABLET | Refills: 0 | Status: SHIPPED | OUTPATIENT
Start: 2023-10-17 | End: 2023-10-17

## 2023-10-17 ASSESSMENT — PAIN SCALES - GENERAL: PAINLEVEL: NO PAIN (0)

## 2023-10-17 NOTE — PROGRESS NOTES
1. Subacute cough  2. Fever, unspecified fever cause  > At this time there is a higher clinical suspicion for bacterial infection given the fact that the patient has been sick for more than 2 weeks and is now developing fevers  > Of note, patient states that in the past when she was prescribed azithromycin she would often require additional antibiotics afterwards   - Respiratory Panel PCR  - XR Chest 2 Views; Future  - prescription sent for dextromethorphan (DELSYM) 30 MG/5ML liquid; Take 10 mLs (60 mg) by mouth 2 times daily  Dispense: 148 mL; Refill: 0  - prescription sent for levofloxacin (LEVAQUIN) 750 MG tablet; Take 1 tablet (750 mg) by mouth daily for 5 days  Dispense: 5 tablet; Refill: 0  - Blood Culture Arm, Right  - Blood Culture Arm, Left     Follow-up plan:   -Patient aware to send a message on [email or web address] if her symptoms do not improve within 48 to 72 hours of starting antibiotics, may need to consider alternative antibiotic treatment for potential sinus infection bearing in mind that the patient does have an allergy to amoxicillin    Nadiya Powers is a 63 year old, presenting for the following health issues:  Cold Symptoms        10/17/2023     3:08 PM   Additional Questions   Roomed by Rachna AZAR LPN   Accompanied by self         10/17/2023     3:08 PM   Patient Reported Additional Medications   Patient reports taking the following new medications no new meds     History of Present Illness       Reason for visit:  Chest cough and back pain  Symptom onset:  More than a month  Symptom intensity:  Severe  Symptom progression:  Worsening  Had these symptoms before:  Yes  Has tried/received treatment for these symptoms:  Yes  Previous treatment was successful:  No    She eats 2-3 servings of fruits and vegetables daily.She consumes 0 sweetened beverage(s) daily.She exercises with enough effort to increase her heart rate 20 to 29 minutes per day.  She exercises with enough effort to increase her heart  rate 7 days per week.   She is taking medications regularly.       Acute Illness  Acute illness concerns: chest cold  Onset/Duration: more than a month  Symptoms:  Fever: YES- highest 102.0F last night (measured with oral thermometer)   Chills/Sweats: YES- chills   Headache (location?): YES  Sinus Pressure: YES  Conjunctivitis:  No  Ear Pain: no  Rhinorrhea: YES  Congestion: YES  Sore Throat: No  Cough: YES-non-productive, with shortness of breath, barking, waxing and waning over time  Wheeze: YES  Decreased Appetite: YES  Nausea: No  Vomiting: No  Diarrhea: No  Dysuria/Freq.: No  Dysuria or Hematuria: No  Fatigue/Achiness: YES  Sick/Strep Exposure: No  Therapies tried and outcome: advil, tylenol, afrin nasal spray (stopped using it), albuterol inhaler have helped some    Cough has been persistent, congestion comes and goes   Fevers are new and began yesterday, improved with tylenol  Works in an elementary school        Review of Systems   As above         Objective    There were no vitals taken for this visit.  There is no height or weight on file to calculate BMI.  Physical Exam  Constitutional:       General: She is not in acute distress.  HENT:      Head: Normocephalic and atraumatic.      Comments: Patient endorses some tenderness to palpation of the frontal and maxillary sinuses     Right Ear: Tympanic membrane, ear canal and external ear normal. There is no impacted cerumen.      Left Ear: Tympanic membrane, ear canal and external ear normal. There is no impacted cerumen.      Nose: Nose normal.      Mouth/Throat:      Mouth: Mucous membranes are moist.      Pharynx: Oropharynx is clear. No oropharyngeal exudate or posterior oropharyngeal erythema.   Eyes:      Extraocular Movements: Extraocular movements intact.   Cardiovascular:      Rate and Rhythm: Normal rate and regular rhythm.      Heart sounds: Normal heart sounds.   Pulmonary:      Effort: Pulmonary effort is normal. No respiratory distress.       Breath sounds: Normal breath sounds. No wheezing or rhonchi.   Abdominal:      Palpations: Abdomen is soft. There is no mass.      Tenderness: There is no abdominal tenderness.   Musculoskeletal:         General: No deformity. Normal range of motion.      Cervical back: Normal range of motion and neck supple.   Skin:     General: Skin is warm.      Findings: No rash.   Neurological:      General: No focal deficit present.      Mental Status: She is alert and oriented to person, place, and time.   Psychiatric:         Mood and Affect: Mood normal.

## 2023-10-18 ENCOUNTER — PATIENT OUTREACH (OUTPATIENT)
Dept: CARE COORDINATION | Facility: CLINIC | Age: 63
End: 2023-10-18
Payer: COMMERCIAL

## 2023-10-18 ENCOUNTER — MYC MEDICAL ADVICE (OUTPATIENT)
Dept: FAMILY MEDICINE | Facility: CLINIC | Age: 63
End: 2023-10-18

## 2023-10-18 NOTE — RESULT ENCOUNTER NOTE
Karlee Krishnan,     It is a pleasure providing you with medical care. I have received and reviewed your lab results, and have the following recommendations:     Based on your respiratory panel it looks like you have an infection with rhinovirus which normally you could treat with supportive care. However, since you have fevers and your chest X-Ray results were consistent with pneumonia please take your antibiotics that were prescribed.       Sincerely,     Melody Zamarripa MD

## 2023-10-18 NOTE — RESULT ENCOUNTER NOTE
Karlee Krishnan,     It is a pleasure providing you with medical care. I have received and reviewed your chest X-Ray results, and have the following recommendations:     Based on your x-ray results you were found to have a possible pneumonia in your left lower lung. The Levaquin antibiotics you were given yesterday do help treat pneumonia. If you are not noticing an improvement in your symptoms after being on the antibiotics for 24-72 hours please message me right away as we will likely have to try a different antibiotic.  Would you please make a follow-up appointment with me to in the next week to make sure you're doing better?      Sincerely,     Melody Zamarripa MD

## 2023-10-19 ENCOUNTER — MYC MEDICAL ADVICE (OUTPATIENT)
Dept: FAMILY MEDICINE | Facility: CLINIC | Age: 63
End: 2023-10-19
Payer: COMMERCIAL

## 2023-10-19 DIAGNOSIS — J18.9 PNEUMONIA OF LEFT LOWER LOBE DUE TO INFECTIOUS ORGANISM: ICD-10-CM

## 2023-10-19 DIAGNOSIS — R05.2 SUBACUTE COUGH: Primary | ICD-10-CM

## 2023-10-19 RX ORDER — DOXYCYCLINE HYCLATE 100 MG
100 TABLET ORAL 2 TIMES DAILY
Qty: 10 TABLET | Refills: 0 | Status: SHIPPED | OUTPATIENT
Start: 2023-10-19 | End: 2023-10-24

## 2023-10-19 NOTE — TELEPHONE ENCOUNTER
Dr Zamarripa  Pt reports allergy to Levaquin and would like another medication.   See mychart  I added to allergy list      Hugo Huizar RN

## 2023-10-22 LAB
BACTERIA BLD CULT: NO GROWTH
BACTERIA BLD CULT: NO GROWTH

## 2023-10-23 ENCOUNTER — MYC REFILL (OUTPATIENT)
Dept: FAMILY MEDICINE | Facility: CLINIC | Age: 63
End: 2023-10-23
Payer: COMMERCIAL

## 2023-10-23 DIAGNOSIS — R05.1 ACUTE COUGH: ICD-10-CM

## 2023-10-23 RX ORDER — ALBUTEROL SULFATE 90 UG/1
2 AEROSOL, METERED RESPIRATORY (INHALATION) EVERY 6 HOURS PRN
Qty: 18 G | Refills: 0 | Status: SHIPPED | OUTPATIENT
Start: 2023-10-23

## 2023-10-23 NOTE — TELEPHONE ENCOUNTER
Routing to physician/provider who has seen patient for this.      I haven't seen since January.    Manisha Han M.D.

## 2023-10-24 ENCOUNTER — TELEPHONE (OUTPATIENT)
Dept: FAMILY MEDICINE | Facility: CLINIC | Age: 63
End: 2023-10-24
Payer: COMMERCIAL

## 2023-10-24 NOTE — TELEPHONE ENCOUNTER
Returned call to pt, and appt rescheduled for same day with PCP/Dr. Han.    Martina Merino RN  [facility]

## 2023-10-24 NOTE — TELEPHONE ENCOUNTER
Reason for Call:  Appointment Request    Patient requesting this type of appt:  office visit    Requested provider:  Melody Zamarripa    Reason patient unable to be scheduled: Not within requested timeframe    When does patient want to be seen/preferred time: 3-7 days    Comments: Patient states Dr. Zamarripa wanted her to follow up soon due to her ammonia, however her appt was cancelled by clinic for 10/26. There are no opening appts until December. Please call patient back to accommodate her into the doctors schedule for sometime this week.     Could we send this information to you in [facility] or would you prefer to receive a phone call?:   Patient would prefer a phone call   Okay to leave a detailed message?: Yes at Cell number on file:    Telephone Information:   Mobile [phone]       Call taken on 10/24/2023 at 5:05 PM by MONIE HAMILTON

## 2023-10-26 ENCOUNTER — OFFICE VISIT (OUTPATIENT)
Dept: FAMILY MEDICINE | Facility: CLINIC | Age: 63
End: 2023-10-26
Payer: COMMERCIAL

## 2023-10-26 VITALS
BODY MASS INDEX: 25.66 KG/M2 | SYSTOLIC BLOOD PRESSURE: 114 MMHG | RESPIRATION RATE: 16 BRPM | TEMPERATURE: 98.3 F | WEIGHT: 154 LBS | OXYGEN SATURATION: 97 % | DIASTOLIC BLOOD PRESSURE: 80 MMHG | HEIGHT: 65 IN | HEART RATE: 82 BPM

## 2023-10-26 DIAGNOSIS — J18.9 PNEUMONIA OF LEFT LOWER LOBE DUE TO INFECTIOUS ORGANISM: Primary | ICD-10-CM

## 2023-10-26 PROCEDURE — 99213 OFFICE O/P EST LOW 20 MIN: CPT | Performed by: FAMILY MEDICINE

## 2023-10-26 ASSESSMENT — PAIN SCALES - GENERAL: PAINLEVEL: NO PAIN (0)

## 2023-10-26 NOTE — PROGRESS NOTES
"  Assessment & Plan       Pneumonia of left lower lobe due to infectious organism  Clinically improving  Likely viral but was treated with antibiotic given LLL infiltrate  Recheck chest x ray in 10 days or so  - XR Chest 2 Views; Future             BMI:   Estimated body mass index is 25.63 kg/m  as calculated from the following:    Height as of this encounter: 1.651 m (5' 5\").    Weight as of this encounter: 69.9 kg (154 lb).           Manisha Han MD  [facility]   Priya is a 63 year old, presenting for the following health issues:  Cough        10/26/2023     9:04 AM   Additional Questions   Roomed by Sarah DINERO MA   Accompanied by Self       HPI       Acute Illness  Acute illness concerns: Follow up on pneumonia  Onset/Duration: About 2 weeks  Symptoms:  Fever: No  Chills/Sweats: No  Headache (location?): No  Sinus Pressure: No  Conjunctivitis:  No  Ear Pain: no  Rhinorrhea: No  Congestion: No  Sore Throat: No  Cough: YES-productive of clear sputum, with shortness of breath.  Not like it was before  Wheeze: YES- a little bit  Decreased Appetite: No  Nausea: No  Vomiting: No  Diarrhea: No  Dysuria/Freq.: No  Dysuria or Hematuria: No  Fatigue/Achiness: No  Sick/Strep Exposure: No  Therapies tried and outcome: Levofloxacin (developed a rash) and then was switched to doxycycline    Cough is looser  Overall feeling better, moving air better    Review of Systems   Constitutional, HEENT, cardiovascular, pulmonary, gi and gu systems are negative, except as otherwise noted.      Objective    /80 (BP Location: Right arm, Patient Position: Chair, Cuff Size: Adult Regular)   Pulse 82   Temp 98.3  F (36.8  C) (Tympanic)   Resp 16   Ht 1.651 m (5' 5\")   Wt 69.9 kg (154 lb)   SpO2 97%   BMI 25.63 kg/m    Body mass index is 25.63 kg/m .  Physical Exam   GENERAL: healthy, alert and no distress  NECK: no adenopathy, no asymmetry, masses, or scars and thyroid normal to " palpation  RESP: overall moving air well, there is no increased WOB. Few wheezes in the left lower lobe  CV: regular rate and rhythm, normal S1 S2, no S3 or S4, no murmur, click or rub, no peripheral edema and peripheral pulses strong  ABDOMEN: soft, nontender, no hepatosplenomegaly, no masses and bowel sounds normal  MS: no gross musculoskeletal defects noted, no edema

## 2023-11-06 ENCOUNTER — ANCILLARY PROCEDURE (OUTPATIENT)
Dept: GENERAL RADIOLOGY | Facility: CLINIC | Age: 63
End: 2023-11-06
Attending: FAMILY MEDICINE
Payer: COMMERCIAL

## 2023-11-06 DIAGNOSIS — J18.9 PNEUMONIA OF LEFT LOWER LOBE DUE TO INFECTIOUS ORGANISM: ICD-10-CM

## 2023-11-06 PROCEDURE — 71046 X-RAY EXAM CHEST 2 VIEWS: CPT | Mod: TC | Performed by: RADIOLOGY

## 2023-11-15 ENCOUNTER — PATIENT OUTREACH (OUTPATIENT)
Dept: CARE COORDINATION | Facility: CLINIC | Age: 63
End: 2023-11-15
Payer: COMMERCIAL

## 2023-11-24 ENCOUNTER — PATIENT OUTREACH (OUTPATIENT)
Dept: CARE COORDINATION | Facility: CLINIC | Age: 63
End: 2023-11-24
Payer: COMMERCIAL

## 2023-12-08 ENCOUNTER — PATIENT OUTREACH (OUTPATIENT)
Dept: CARE COORDINATION | Facility: CLINIC | Age: 63
End: 2023-12-08
Payer: COMMERCIAL

## 2023-12-27 ENCOUNTER — IMMUNIZATION (OUTPATIENT)
Dept: FAMILY MEDICINE | Facility: CLINIC | Age: 63
End: 2023-12-27
Payer: COMMERCIAL

## 2023-12-27 PROCEDURE — 90686 IIV4 VACC NO PRSV 0.5 ML IM: CPT

## 2023-12-27 PROCEDURE — 90471 IMMUNIZATION ADMIN: CPT

## 2023-12-28 ENCOUNTER — ALLIED HEALTH/NURSE VISIT (OUTPATIENT)
Dept: FAMILY MEDICINE | Facility: CLINIC | Age: 63
End: 2023-12-28
Payer: COMMERCIAL

## 2023-12-28 DIAGNOSIS — Z23 ENCOUNTER FOR IMMUNIZATION: Primary | ICD-10-CM

## 2023-12-28 PROCEDURE — 99207 PR NO CHARGE NURSE ONLY: CPT

## 2023-12-28 PROCEDURE — 90471 IMMUNIZATION ADMIN: CPT

## 2023-12-28 PROCEDURE — 90678 RSV VACC PREF BIVALENT IM: CPT

## 2023-12-28 NOTE — PROGRESS NOTES
Prior to immunization administration, verified patients identity using patient s name and date of birth. Please see Immunization Activity for additional information.     Screening Questionnaire for Adult Immunization    Are you sick today?   No   Do you have allergies to medications, food, a vaccine component or latex?   yes   Have you ever had a serious reaction after receiving a vaccination?   No   Do you have a long-term health problem with heart, lung, kidney, or metabolic disease (e.g., diabetes), asthma, a blood disorder, no spleen, complement component deficiency, a cochlear implant, or a spinal fluid leak?  Are you on long-term aspirin therapy?   No   Do you have cancer, leukemia, HIV/AIDS, or any other immune system problem?   No   Do you have a parent, brother, or sister with an immune system problem?   No   In the past 3 months, have you taken medications that affect  your immune system, such as prednisone, other steroids, or anticancer drugs; drugs for the treatment of rheumatoid arthritis, Crohn s disease, or psoriasis; or have you had radiation treatments?   No   Have you had a seizure, or a brain or other nervous system problem?   No   During the past year, have you received a transfusion of blood or blood    products, or been given immune (gamma) globulin or antiviral drug?   No   For women: Are you pregnant or is there a chance you could become       pregnant during the next month?   No   Have you received any vaccinations in the past 4 weeks?   Yes     Immunization questionnaire was positive for at least one answer.  Ok per guidelines     I have reviewed the following standing orders:   This patient is due and qualifies for the RSV vaccine.    Click here for RSV Vaccine Standing Order    I have reviewed the vaccines inclusion and exclusion criteria; No concerns regarding eligibility.     Patient instructed to remain in clinic for 15 minutes afterwards, and to report any adverse reactions.      Screening performed by Greyson Britt CMA on 12/28/2023 at 9:44 AM.

## 2024-01-10 ENCOUNTER — OFFICE VISIT (OUTPATIENT)
Dept: FAMILY MEDICINE | Facility: CLINIC | Age: 64
End: 2024-01-10
Payer: COMMERCIAL

## 2024-01-10 VITALS
RESPIRATION RATE: 16 BRPM | BODY MASS INDEX: 26.16 KG/M2 | WEIGHT: 157 LBS | TEMPERATURE: 97.2 F | SYSTOLIC BLOOD PRESSURE: 124 MMHG | HEIGHT: 65 IN | OXYGEN SATURATION: 100 % | HEART RATE: 74 BPM | DIASTOLIC BLOOD PRESSURE: 86 MMHG

## 2024-01-10 DIAGNOSIS — L65.0 TELOGEN EFFLUVIUM: ICD-10-CM

## 2024-01-10 DIAGNOSIS — Z12.11 SCREEN FOR COLON CANCER: ICD-10-CM

## 2024-01-10 DIAGNOSIS — F43.22 ADJUSTMENT DISORDER WITH ANXIOUS MOOD: ICD-10-CM

## 2024-01-10 DIAGNOSIS — C44.310 BCC (BASAL CELL CARCINOMA), FACE: ICD-10-CM

## 2024-01-10 DIAGNOSIS — Z12.31 VISIT FOR SCREENING MAMMOGRAM: ICD-10-CM

## 2024-01-10 DIAGNOSIS — Z78.0 POSTMENOPAUSAL STATUS: ICD-10-CM

## 2024-01-10 DIAGNOSIS — Z00.00 ROUTINE GENERAL MEDICAL EXAMINATION AT A HEALTH CARE FACILITY: Primary | ICD-10-CM

## 2024-01-10 PROCEDURE — 99214 OFFICE O/P EST MOD 30 MIN: CPT | Mod: 25 | Performed by: FAMILY MEDICINE

## 2024-01-10 PROCEDURE — 90471 IMMUNIZATION ADMIN: CPT | Performed by: FAMILY MEDICINE

## 2024-01-10 PROCEDURE — 90715 TDAP VACCINE 7 YRS/> IM: CPT | Performed by: FAMILY MEDICINE

## 2024-01-10 PROCEDURE — 99396 PREV VISIT EST AGE 40-64: CPT | Mod: 25 | Performed by: FAMILY MEDICINE

## 2024-01-10 RX ORDER — ESCITALOPRAM OXALATE 10 MG/1
10 TABLET ORAL DAILY
Qty: 30 TABLET | Refills: 1 | Status: SHIPPED | OUTPATIENT
Start: 2024-01-10 | End: 2024-07-29

## 2024-01-10 ASSESSMENT — ENCOUNTER SYMPTOMS
ARTHRALGIAS: 0
HEARTBURN: 0
NERVOUS/ANXIOUS: 0
HEMATURIA: 0
WEAKNESS: 0
HEMATOCHEZIA: 0
NAUSEA: 0
DIZZINESS: 0
SORE THROAT: 0
CONSTIPATION: 0
HEADACHES: 0
JOINT SWELLING: 0
DYSURIA: 0
EYE PAIN: 0
MYALGIAS: 0
PALPITATIONS: 0
ABDOMINAL PAIN: 0
COUGH: 0
SHORTNESS OF BREATH: 0
CHILLS: 0
FEVER: 0
BREAST MASS: 0
FREQUENCY: 0
PARESTHESIAS: 0
DIARRHEA: 0

## 2024-01-10 ASSESSMENT — PAIN SCALES - GENERAL: PAINLEVEL: NO PAIN (0)

## 2024-01-10 NOTE — PROGRESS NOTES
SUBJECTIVE:   Priya is a 63 year old, presenting for the following:  Physical        1/10/2024     8:30 AM   Additional Questions   Roomed by Irene erazo CMA   Accompanied by Self       Healthy Habits:     Getting at least 3 servings of Calcium per day:  Yes    Bi-annual eye exam:  Yes    Dental care twice a year:  NO    Sleep apnea or symptoms of sleep apnea:  None    Diet:  Regular (no restrictions)    Frequency of exercise:  6-7 days/week    Duration of exercise:  15-30 minutes    Taking medications regularly:  Not Applicable    Medication side effects:  Not applicable    Additional concerns today:  No    - Sleep problems.     - Hair loss.       Social History     Tobacco Use    Smoking status: Never     Passive exposure: Never    Smokeless tobacco: Never   Substance Use Topics    Alcohol use: Yes     Comment: occas             1/10/2024     8:26 AM   Alcohol Use   Prescreen: >3 drinks/day or >7 drinks/week? No         2022     7:54 AM   AUDIT - Alcohol Use Disorders Identification Test - Reproduced from the World Health Organization Audit 2001 (Second Edition)   1.  How often do you have a drink containing alcohol? 4 or more times a week   2.  How many drinks containing alcohol do you have on a typical day when you are drinking? 1 or 2   3.  How often do you have five or more drinks on one occasion? Never   9.  Have you or someone else been injured because of your drinking? No   10. Has a relative, friend, doctor or other health care worker been concerned about your drinking or suggested you cut down? No     Reviewed orders with patient.  Reviewed health maintenance and updated orders accordingly - Yes  Lab work is in process  Labs reviewed in EPIC    Breast Cancer Screenin/4/2021     7:42 AM   Breast CA Risk Assessment (FHS-7)   Do you have a family history of breast, colon, or ovarian cancer? No / Unknown       click delete button to remove this line now  Mammogram Screening: Recommended annual  "mammography  Pertinent mammograms are reviewed under the imaging tab.    History of abnormal Pap smear: NO - age 30-65 PAP every 5 years with negative HPV co-testing recommended      Latest Ref Rng & Units 12/23/2022     8:08 AM 2/9/2018     9:15 AM 2/9/2018     9:11 AM   PAP / HPV   PAP  Negative for Intraepithelial Lesion or Malignancy (NILM)      PAP (Historical)    NIL    HPV 16 DNA Negative Negative  Negative     HPV 18 DNA Negative Negative  Negative     Other HR HPV Negative Negative  Negative       Reviewed and updated as needed this visit by clinical staff   Tobacco  Allergies  Meds              Reviewed and updated as needed this visit by Provider                     Review of Systems   Constitutional:  Negative for chills and fever.   HENT:  Positive for hearing loss. Negative for congestion, ear pain and sore throat.    Eyes:  Negative for pain and visual disturbance.   Respiratory:  Negative for cough and shortness of breath.    Cardiovascular:  Negative for chest pain, palpitations and peripheral edema.   Gastrointestinal:  Negative for abdominal pain, constipation, diarrhea, heartburn, hematochezia and nausea.   Breasts:  Negative for tenderness, breast mass and discharge.   Genitourinary:  Negative for dysuria, frequency, genital sores, hematuria, pelvic pain, urgency, vaginal bleeding and vaginal discharge.   Musculoskeletal:  Negative for arthralgias, joint swelling and myalgias.   Skin:  Negative for rash.   Neurological:  Negative for dizziness, weakness, headaches and paresthesias.   Psychiatric/Behavioral:  Negative for mood changes. The patient is not nervous/anxious.           OBJECTIVE:   /86   Pulse 74   Temp 97.2  F (36.2  C) (Tympanic)   Resp 16   Ht 1.651 m (5' 5\")   Wt 71.2 kg (157 lb)   SpO2 100%   BMI 26.13 kg/m    Physical Exam  GENERAL: healthy, alert and no distress  NECK: no adenopathy, no asymmetry, masses, or scars and thyroid normal to palpation  RESP: lungs " clear to auscultation - no rales, rhonchi or wheezes  BREAST: normal without masses, tenderness or nipple discharge and no palpable axillary masses or adenopathy  CV: regular rate and rhythm, normal S1 S2, no S3 or S4, no murmur, click or rub, no peripheral edema and peripheral pulses strong  ABDOMEN: soft, nontender, no hepatosplenomegaly, no masses and bowel sounds normal  MS: no gross musculoskeletal defects noted, no edema    Diagnostic Test Results:  Labs reviewed in Epic    ASSESSMENT/PLAN:   (Z00.00) Routine general medical examination at a health care facility  (primary encounter diagnosis)  Comment:    Plan:      (Z12.11) Screen for colon cancer  Comment: due for colonoscopy  Plan: Colonoscopy Screening  Referral             (Z12.31) Visit for screening mammogram  Comment: due for mammogram, reminded  Plan: MA SCREENING DIGITAL BILAT - Future  (s+30)             (Z78.0) Postmenopausal status  Comment:    Plan: DX Hip/Pelvis/Spine             (C44.310) BCC (basal cell carcinoma), face  Comment: had seen [facility] for basal cell carcinoma on scalp, had infection after MOHS, healing now  Plan:     (F43.22) Adjustment disorder with anxious mood  Comment:  risks, benefits and alternatives discussed   Start medication  Follow up (virtual or mychart) in 1 month to recheck symptoms  Hopefully this will help with her insomnia as well as she's waking at 3 am or so and can't shut her brain down again.  Worries about /kids/grandkids.  Anxiety worsened during COVID  Plan: escitalopram (LEXAPRO) 10 MG tablet           Telogen Effluvium  - has had labs done with derm  -they prescribed minoxidil but she didn't start this  Discussed that it may be illness/anxiety related    Patient has been advised of split billing requirements and indicates understanding: Yes      COUNSELING:  Reviewed preventive health counseling, as reflected in patient instructions       Regular exercise       Healthy  "diet/nutrition      BMI:   Estimated body mass index is 26.13 kg/m  as calculated from the following:    Height as of this encounter: 1.651 m (5' 5\").    Weight as of this encounter: 71.2 kg (157 lb).         She reports that she has never smoked. She has never been exposed to tobacco smoke. She has never used smokeless tobacco.        Manisha Han MD  [facility]  "

## 2024-02-25 ENCOUNTER — HEALTH MAINTENANCE LETTER (OUTPATIENT)
Age: 64
End: 2024-02-25

## 2024-05-15 ENCOUNTER — PATIENT OUTREACH (OUTPATIENT)
Dept: CARE COORDINATION | Facility: CLINIC | Age: 64
End: 2024-05-15
Payer: COMMERCIAL

## 2024-07-17 ENCOUNTER — ANCILLARY PROCEDURE (OUTPATIENT)
Dept: GENERAL RADIOLOGY | Facility: CLINIC | Age: 64
End: 2024-07-17
Attending: STUDENT IN AN ORGANIZED HEALTH CARE EDUCATION/TRAINING PROGRAM
Payer: COMMERCIAL

## 2024-07-17 ENCOUNTER — OFFICE VISIT (OUTPATIENT)
Dept: FAMILY MEDICINE | Facility: CLINIC | Age: 64
End: 2024-07-17
Payer: COMMERCIAL

## 2024-07-17 VITALS
DIASTOLIC BLOOD PRESSURE: 78 MMHG | OXYGEN SATURATION: 99 % | BODY MASS INDEX: 26.43 KG/M2 | WEIGHT: 154.8 LBS | TEMPERATURE: 69.5 F | RESPIRATION RATE: 18 BRPM | SYSTOLIC BLOOD PRESSURE: 126 MMHG | HEIGHT: 64 IN | HEART RATE: 86 BPM

## 2024-07-17 DIAGNOSIS — R09.89 ABNORMAL LUNG SOUNDS: Primary | ICD-10-CM

## 2024-07-17 DIAGNOSIS — R09.89 ABNORMAL LUNG SOUNDS: ICD-10-CM

## 2024-07-17 DIAGNOSIS — J18.9 COMMUNITY ACQUIRED PNEUMONIA OF RIGHT MIDDLE LOBE OF LUNG: Primary | ICD-10-CM

## 2024-07-17 DIAGNOSIS — R05.1 ACUTE COUGH: ICD-10-CM

## 2024-07-17 PROCEDURE — 99214 OFFICE O/P EST MOD 30 MIN: CPT | Performed by: STUDENT IN AN ORGANIZED HEALTH CARE EDUCATION/TRAINING PROGRAM

## 2024-07-17 PROCEDURE — 71046 X-RAY EXAM CHEST 2 VIEWS: CPT | Mod: TC | Performed by: RADIOLOGY

## 2024-07-17 RX ORDER — AZITHROMYCIN 250 MG/1
TABLET, FILM COATED ORAL
Qty: 6 TABLET | Refills: 0 | Status: SHIPPED | OUTPATIENT
Start: 2024-07-17 | End: 2024-07-22

## 2024-07-17 ASSESSMENT — PAIN SCALES - GENERAL: PAINLEVEL: NO PAIN (0)

## 2024-07-17 ASSESSMENT — ENCOUNTER SYMPTOMS: COUGH: 1

## 2024-07-17 NOTE — RESULT ENCOUNTER NOTE
Karlee Krishnan,     It is a pleasure providing you with medical care. I have received and reviewed your results, and have the following recommendations:     That was very horton of you to come to clinic! Your chest X-Ray findings are consistent with pneumonia in your right lung. Because of this I will send a prescription for azithromycin over to your pharmacy since you have a documented allergy to amoxicillin/Penicillin.       Sincerely,     Melody Zamarripa MD

## 2024-07-17 NOTE — PROGRESS NOTES
"  Assessment & Plan     Abnormal lung sounds  Acute cough, soon to be subacute if cough persists for another week   > Given that the both the patient and her  were symptomatic and the fact that her  symptoms improved, I would suspect that the etiology of their symptoms are viral in nature, however, patient has now been noticing worsening cough and breathing complaints and she does admit to being susceptible to respiratory infections often requiring antibiotics in the past  - At this time I would have a higher clinical suspicion for bronchitis which guideline recommendations include limiting antibiotic use, however, given the fact the patient has had similar symptoms like this in the past which did improve with antibiotics I am also taking into account her clinical history and have a lower threshold for prescribing her azithromycin which she responded to favorably in the past  - XR Chest 2 Views; Future      BMI  Estimated body mass index is 26.27 kg/m  as calculated from the following:    Height as of this encounter: 1.635 m (5' 4.37\").    Weight as of this encounter: 70.2 kg (154 lb 12.8 oz).       Nadiya Powers is a 64 year old, presenting for the following health issues:  Cough (& wheezing x 2 weeks )        7/17/2024    10:14 AM   Additional Questions   Roomed by Shandra MEI     History of Present Illness       Reason for visit:  Cough and wheezing  Symptom onset:  1-2 weeks ago    She eats 0-1 servings of fruits and vegetables daily.She consumes 0 sweetened beverage(s) daily.She exercises with enough effort to increase her heart rate 20 to 29 minutes per day.  She exercises with enough effort to increase her heart rate 7 days per week.   She is taking medications regularly.       Acute Illness  Acute illness concerns: Pneumonia   Onset/Duration: x 2 weeks   Symptoms:  Fever: YES- at night, her highest Temp was 100.1F (oral)   Chills/Sweats: YES- at night   Headache (location?): YES  Sinus " "Pressure: No  Conjunctivitis:  No  Ear Pain: no  Rhinorrhea: No  Congestion: No  Sore Throat: No  Cough: YES-productive of green sputum, barking, worsening over time  Wheeze: YES  Decreased Appetite: YES  Nausea: No  Vomiting: No  Diarrhea: No  Dysuria/Freq.: No  Dysuria or Hematuria: No  Fatigue/Achiness: YES  Sick/Strep Exposure: YES - has 2 granddaughters and they always have runny noses   Therapies tried and outcome: just inhaler, has not felt improvement, has not tried over the counter cold medications, but has taken advil and tylenol which helps with headaches     Her granddaughter did have pneumonia. Per patient, the grandchild is prone to respiratory infections and was on antibiotics which helped her symptoms. Her  was also sick with similar symptoms except that he improved and the patient continues to have symptoms. Throughout the day she is very tired and has no appetite. She and her family were in [place] at the end of June. She did take an at home COVID test 4 days ago that was negative x1. She has had the RSV vaccine. She reports that in the past she has been prone to respiratory illnesses requiring treatment with antibiotics. Breathing \"the coughing and rattling\" has reportedly been worsening since symptoms onset.     ROS:   As above           Objective    /78 (BP Location: Right arm, Patient Position: Right side, Cuff Size: Adult Regular)   Pulse 86   Temp (!) 69.5  F (20.8  C) (Tympanic)   Resp 18   Ht 1.635 m (5' 4.37\")   Wt 70.2 kg (154 lb 12.8 oz)   SpO2 99%   BMI 26.27 kg/m    Body mass index is 26.27 kg/m .  Physical Exam  Constitutional:       General: She is not in acute distress.  HENT:      Head: Normocephalic and atraumatic.      Right Ear: External ear normal.      Left Ear: External ear normal.      Mouth/Throat:      Mouth: Mucous membranes are moist.      Pharynx: Oropharynx is clear. No oropharyngeal exudate or posterior oropharyngeal erythema.   Eyes:      " Extraocular Movements: Extraocular movements intact.   Cardiovascular:      Rate and Rhythm: Normal rate and regular rhythm.      Heart sounds: Normal heart sounds.   Pulmonary:      Effort: Pulmonary effort is normal. No respiratory distress.      Breath sounds: Wheezing present. No rhonchi.      Comments: Coarse, rattling breath sounds throughout all lung fields both posteriorly and anteriorly but did improve with forceful coughing   Abdominal:      Palpations: Abdomen is soft. There is no mass.      Tenderness: There is no abdominal tenderness.   Musculoskeletal:         General: No deformity. Normal range of motion.      Cervical back: Normal range of motion and neck supple.   Skin:     General: Skin is warm.      Findings: No rash.   Neurological:      General: No focal deficit present.      Mental Status: She is alert and oriented to person, place, and time.   Psychiatric:         Mood and Affect: Mood normal.                Signed Electronically by: TETE PLATA MD

## 2024-07-24 ENCOUNTER — MYC MEDICAL ADVICE (OUTPATIENT)
Dept: FAMILY MEDICINE | Facility: CLINIC | Age: 64
End: 2024-07-24
Payer: COMMERCIAL

## 2024-07-24 NOTE — TELEPHONE ENCOUNTER
Dr Zamarripa  Please advise.  Please see My Chart message from patient from today.  She is improved with her pneumonia but still is having to use her Albuteral Inhaler every 4 hours usually.  The wheeze turns into cough and cough bothers her a lot still.   No fevers.

## 2024-07-25 ENCOUNTER — ALLIED HEALTH/NURSE VISIT (OUTPATIENT)
Dept: FAMILY MEDICINE | Facility: CLINIC | Age: 64
End: 2024-07-25
Payer: COMMERCIAL

## 2024-07-25 VITALS — HEART RATE: 82 BPM | OXYGEN SATURATION: 98 % | SYSTOLIC BLOOD PRESSURE: 121 MMHG | DIASTOLIC BLOOD PRESSURE: 86 MMHG

## 2024-07-25 DIAGNOSIS — J18.9 COMMUNITY ACQUIRED PNEUMONIA OF RIGHT MIDDLE LOBE OF LUNG: Primary | ICD-10-CM

## 2024-07-25 PROCEDURE — 99207 PR NO CHARGE NURSE ONLY: CPT

## 2024-07-25 NOTE — PROGRESS NOTES
Pt arrives for nurse visit as she missed her appt with provider because she showed up to wrong clinic.   Pt was diagnosed with pneumonia 7/17 and has finished z pack. Some improvement. Can sleep thru night now and could not before. States is not worse but still having significant cough.   Denies fever  Denies sob  Offered pt either uc or appt tomorrow. Pt not able to come in tomorrow at all so she requested appt Monday. Scheduled appt and advised pt to go to uc/er with any fever, sob or worsening symptoms. Pt will cancel if continues to improve.   Vss.       Hugo Huizar RN

## 2024-07-29 ENCOUNTER — OFFICE VISIT (OUTPATIENT)
Dept: FAMILY MEDICINE | Facility: CLINIC | Age: 64
End: 2024-07-29
Payer: COMMERCIAL

## 2024-07-29 VITALS
HEIGHT: 65 IN | DIASTOLIC BLOOD PRESSURE: 74 MMHG | SYSTOLIC BLOOD PRESSURE: 124 MMHG | BODY MASS INDEX: 25.72 KG/M2 | HEART RATE: 82 BPM | WEIGHT: 154.4 LBS | OXYGEN SATURATION: 100 % | RESPIRATION RATE: 12 BRPM | TEMPERATURE: 97.8 F

## 2024-07-29 DIAGNOSIS — J18.9 PNEUMONIA OF RIGHT LOWER LOBE DUE TO INFECTIOUS ORGANISM: Primary | ICD-10-CM

## 2024-07-29 PROCEDURE — 99213 OFFICE O/P EST LOW 20 MIN: CPT | Performed by: FAMILY MEDICINE

## 2024-07-29 ASSESSMENT — PAIN SCALES - GENERAL: PAINLEVEL: NO PAIN (0)

## 2024-08-05 ENCOUNTER — HOSPITAL ENCOUNTER (OUTPATIENT)
Dept: MAMMOGRAPHY | Facility: CLINIC | Age: 64
Discharge: HOME | End: 2024-08-05
Attending: FAMILY MEDICINE | Admitting: FAMILY MEDICINE
Payer: COMMERCIAL

## 2024-08-05 DIAGNOSIS — Z12.31 VISIT FOR SCREENING MAMMOGRAM: ICD-10-CM

## 2024-08-05 PROCEDURE — 77063 BREAST TOMOSYNTHESIS BI: CPT

## 2025-01-13 ENCOUNTER — OFFICE VISIT (OUTPATIENT)
Dept: FAMILY MEDICINE | Facility: CLINIC | Age: 65
End: 2025-01-13
Attending: FAMILY MEDICINE
Payer: COMMERCIAL

## 2025-01-13 ENCOUNTER — MYC MEDICAL ADVICE (OUTPATIENT)
Dept: FAMILY MEDICINE | Facility: CLINIC | Age: 65
End: 2025-01-13

## 2025-01-13 VITALS
WEIGHT: 158.25 LBS | DIASTOLIC BLOOD PRESSURE: 80 MMHG | OXYGEN SATURATION: 98 % | HEIGHT: 65 IN | HEART RATE: 86 BPM | BODY MASS INDEX: 26.37 KG/M2 | SYSTOLIC BLOOD PRESSURE: 120 MMHG | TEMPERATURE: 97.7 F

## 2025-01-13 DIAGNOSIS — G89.29 CHRONIC LOW BACK PAIN WITHOUT SCIATICA, UNSPECIFIED BACK PAIN LATERALITY: ICD-10-CM

## 2025-01-13 DIAGNOSIS — Z12.11 SCREEN FOR COLON CANCER: Primary | ICD-10-CM

## 2025-01-13 DIAGNOSIS — J32.9 RECURRENT SINUSITIS: ICD-10-CM

## 2025-01-13 DIAGNOSIS — G89.29 CHRONIC FACIAL PAIN: ICD-10-CM

## 2025-01-13 DIAGNOSIS — Z78.0 POSTMENOPAUSAL STATUS: ICD-10-CM

## 2025-01-13 DIAGNOSIS — R51.9 CHRONIC FACIAL PAIN: ICD-10-CM

## 2025-01-13 DIAGNOSIS — M54.50 CHRONIC LOW BACK PAIN WITHOUT SCIATICA, UNSPECIFIED BACK PAIN LATERALITY: ICD-10-CM

## 2025-01-13 DIAGNOSIS — Z00.00 ROUTINE GENERAL MEDICAL EXAMINATION AT A HEALTH CARE FACILITY: Primary | ICD-10-CM

## 2025-01-13 DIAGNOSIS — Z12.11 SCREEN FOR COLON CANCER: ICD-10-CM

## 2025-01-13 PROCEDURE — 90471 IMMUNIZATION ADMIN: CPT | Performed by: FAMILY MEDICINE

## 2025-01-13 PROCEDURE — 99213 OFFICE O/P EST LOW 20 MIN: CPT | Mod: 25 | Performed by: FAMILY MEDICINE

## 2025-01-13 PROCEDURE — 90677 PCV20 VACCINE IM: CPT | Performed by: FAMILY MEDICINE

## 2025-01-13 PROCEDURE — 99396 PREV VISIT EST AGE 40-64: CPT | Mod: 25 | Performed by: FAMILY MEDICINE

## 2025-01-13 SDOH — HEALTH STABILITY: PHYSICAL HEALTH: ON AVERAGE, HOW MANY DAYS PER WEEK DO YOU ENGAGE IN MODERATE TO STRENUOUS EXERCISE (LIKE A BRISK WALK)?: 7 DAYS

## 2025-01-13 SDOH — HEALTH STABILITY: PHYSICAL HEALTH: ON AVERAGE, HOW MANY MINUTES DO YOU ENGAGE IN EXERCISE AT THIS LEVEL?: 30 MIN

## 2025-01-13 ASSESSMENT — SOCIAL DETERMINANTS OF HEALTH (SDOH): HOW OFTEN DO YOU GET TOGETHER WITH FRIENDS OR RELATIVES?: TWICE A WEEK

## 2025-01-13 ASSESSMENT — PAIN SCALES - GENERAL: PAINLEVEL_OUTOF10: NO PAIN (0)

## 2025-01-13 NOTE — PROGRESS NOTES
"Preventive Care Visit  [facility]  Manisha Han MD, Family Medicine  Jan 13, 2025      Assessment & Plan     Routine general medical examination at a health care facility     - Hemoglobin A1c; Future    Chronic facial pain  Left maxillary pain - gets recurrent sinus infections - last this fall ~2-3 months ago (did online care so I don't have those details - treated with doxycycline).   Has had a left sides root canal in the past, has ongoing facial pain/pressure.  Has not seen dentist regularly   - CT Sinus w/o Contrast; Future    Recurrent sinusitis  As above  - CT Sinus w/o Contrast; Future    Postmenopausal status  Due for DEXA  - DX Bone Density; Future    Chronic low back pain without sciatica, unspecified back pain laterality  Chest x ray done this fall indicated some degenerative disease in her spine, we discussed that. She has intermittent pain that will stop her on rising from bed or a chair and needs to readjust but then it doesn't bother her during the day for the most part.  Will bother her or she will get pain if someone pushes on her back at times.    Screen for colon cancer   Risks, benefits and alternatives discussed    Patient prefers cologuard  - COLOGUARD([employer]); Future    Patient has been advised of split billing requirements and indicates understanding: Yes        BMI  Estimated body mass index is 26.74 kg/m  as calculated from the following:    Height as of this encounter: 1.638 m (5' 4.5\").    Weight as of this encounter: 71.8 kg (158 lb 4 oz).   Weight management plan: Discussed healthy diet and exercise guidelines    Counseling  Appropriate preventive services were addressed with this patient via screening, questionnaire, or discussion as appropriate for fall prevention, nutrition, physical activity, Tobacco-use cessation, social engagement, weight loss and cognition.  Checklist reviewing preventive services available has been given to the " patient.  Reviewed patient's diet, addressing concerns and/or questions.   The patient was instructed to see the dentist every 6 months.   She is at risk for psychosocial distress and has been provided with information to reduce risk.           Nadiya Powers is a 64 year old, presenting for the following:  Physical        1/13/2025     8:12 AM   Additional Questions   Roomed by Irene ARNETT CMA   Accompanied by Self          HPI    - Pneumonia vaccine,    - Discuss completing DEXA    Health Care Directive  Patient has a Health Care Directive on file  Advance care planning document is on file and is current.      1/13/2025   General Health   How would you rate your overall physical health? Good   Feel stress (tense, anxious, or unable to sleep) To some extent   (!) STRESS CONCERN      1/13/2025   Nutrition   Three or more servings of calcium each day? Yes   Diet: Regular (no restrictions)   How many servings of fruit and vegetables per day? (!) 2-3   How many sweetened beverages each day? 0-1         1/13/2025   Exercise   Days per week of moderate/strenous exercise 7 days   Average minutes spent exercising at this level 30 min         1/13/2025   Social Factors   Frequency of gathering with friends or relatives Twice a week   Worry food won't last until get money to buy more No   Food not last or not have enough money for food? No   Do you have housing? (Housing is defined as stable permanent housing and does not include staying ouside in a car, in a tent, in an abandoned building, in an overnight shelter, or couch-surfing.) Yes   Are you worried about losing your housing? No   Lack of transportation? No   Unable to get utilities (heat,electricity)? No         1/13/2025   Fall Risk   Fallen 2 or more times in the past year? No   Trouble with walking or balance? No          1/13/2025   Dental   Dentist two times every year? (!) NO         1/13/2025   TB Screening   Were you born outside of the [place]? No         Today's  PHQ-2 Score:       1/13/2025     8:08 AM   PHQ-2 ( 1999 [employer])   Q1: Little interest or pleasure in doing things 0   Q2: Feeling down, depressed or hopeless 0   PHQ-2 Score 0    Q1: Little interest or pleasure in doing things Not at all   Q2: Feeling down, depressed or hopeless Not at all   PHQ-2 Score 0       Patient-reported           1/13/2025   Substance Use   Alcohol more than 3/day or more than 7/wk No   Do you use any other substances recreationally? No     Social History     Tobacco Use    Smoking status: Never     Passive exposure: Never    Smokeless tobacco: Never   Vaping Use    Vaping status: Never Used   Substance Use Topics    Alcohol use: Yes     Comment: occas    Drug use: No           8/5/2024   LAST FHS-7 RESULTS   1st degree relative breast or ovarian cancer No   Any relative bilateral breast cancer No   Any male have breast cancer No   Any ONE woman have BOTH breast AND ovarian cancer No   Any woman with breast cancer before 50yrs No   2 or more relatives with breast AND/OR ovarian cancer No   2 or more relatives with breast AND/OR bowel cancer No        Mammogram Screening - Mammogram every 1-2 years updated in Health Maintenance based on mutual decision making        1/13/2025   STI Screening   New sexual partner(s) since last STI/HIV test? No     History of abnormal Pap smear: No - age 30-64 HPV with reflex Pap every 5 years recommended        Latest Ref Rng & Units 12/23/2022     8:08 AM 2/9/2018     9:15 AM 2/9/2018     9:11 AM   PAP / HPV   PAP  Negative for Intraepithelial Lesion or Malignancy (NILM)      PAP (Historical)    NIL    HPV 16 DNA Negative Negative  Negative     HPV 18 DNA Negative Negative  Negative     Other HR HPV Negative Negative  Negative       ASCVD Risk   The ASCVD Risk score (Berry CARTAGENA, et al., 2019) failed to calculate for the following reasons:    The valid HDL cholesterol range is 20 to 100 mg/dL    Fracture Risk Assessment Tool  Link to [employer]  "Calculator  Use the information below to complete the [employer] calculator  : 1960  Sex: female  Weight (kg): 71.8 kg (actual weight)  Height (cm): 163.8 cm  Previous Fragility Fracture:  No  History of parent with fractured hip:  No  Current Smoking:  No  Patient has been on glucocorticoids for more than 3 months (5mg/day or more): No  Rheumatoid Arthritis on Problem List:  No  Secondary Osteoporosis on Problem List:  No  Consumes 3 or more units of alcohol per day: No  Femoral Neck BMD (g/cm2)           Reviewed and updated as needed this visit by Provider                        Review of Systems  Constitutional, HEENT, cardiovascular, pulmonary, gi and gu systems are negative, except as otherwise noted.    Did an      Objective    Exam  /80   Pulse 86   Temp 97.7  F (36.5  C) (Tympanic)   Ht 1.638 m (5' 4.5\")   Wt 71.8 kg (158 lb 4 oz)   SpO2 98%   BMI 26.74 kg/m     Estimated body mass index is 26.74 kg/m  as calculated from the following:    Height as of this encounter: 1.638 m (5' 4.5\").    Weight as of this encounter: 71.8 kg (158 lb 4 oz).    Physical Exam  GENERAL: alert and no distress  HENT: normal cephalic/atraumatic, right ear: normal: no effusions, no erythema, normal landmarks, left ear: normal: no effusions, no erythema, normal landmarks, nose and mouth without ulcers or lesions, oropharynx clear, and oral mucous membranes moist  NECK: no adenopathy, no asymmetry, masses, or scars  RESP: lungs clear to auscultation - no rales, rhonchi or wheezes  CV: regular rate and rhythm, normal S1 S2, no S3 or S4, no murmur, click or rub, no peripheral edema  ABDOMEN: soft, nontender, no hepatosplenomegaly, no masses and bowel sounds normal  MS: no gross musculoskeletal defects noted, no edema  BACK:  full range of motion.  Non tender to palpation   No SI joint pain.      Signed Electronically by: Manisha Han MD    "

## 2025-01-13 NOTE — PATIENT INSTRUCTIONS
Patient Education   Preventive Care Advice   This is general advice given by our system to help you stay healthy. However, your care team may have specific advice just for you. Please talk to your care team about your preventive care needs.  Nutrition  Eat 5 or more servings of fruits and vegetables each day.  Try wheat bread, brown rice and whole grain pasta (instead of white bread, rice, and pasta).  Get enough calcium and vitamin D. Check the label on foods and aim for 100% of the RDA (recommended daily allowance).  Lifestyle  Exercise at least 150 minutes each week  (30 minutes a day, 5 days a week).  Do muscle strengthening activities 2 days a week. These help control your weight and prevent disease.  No smoking.  Wear sunscreen to prevent skin cancer.  Have a dental exam and cleaning every 6 months.  Yearly exams  See your health care team every year to talk about:  Any changes in your health.  Any medicines your care team has prescribed.  Preventive care, family planning, and ways to prevent chronic diseases.  Shots (vaccines)   HPV shots (up to age 26), if you've never had them before.  Hepatitis B shots (up to age 59), if you've never had them before.  COVID-19 shot: Get this shot when it's due.  Flu shot: Get a flu shot every year.  Tetanus shot: Get a tetanus shot every 10 years.  Pneumococcal, hepatitis A, and RSV shots: Ask your care team if you need these based on your risk.  Shingles shot (for age 50 and up)  General health tests  Diabetes screening:  Starting at age 35, Get screened for diabetes at least every 3 years.  If you are younger than age 35, ask your care team if you should be screened for diabetes.  Cholesterol test: At age 39, start having a cholesterol test every 5 years, or more often if advised.  Bone density scan (DEXA): At age 50, ask your care team if you should have this scan for osteoporosis (brittle bones).  Hepatitis C: Get tested at least once in your life.  STIs (sexually  transmitted infections)  Before age 24: Ask your care team if you should be screened for STIs.  After age 24: Get screened for STIs if you're at risk. You are at risk for STIs (including HIV) if:  You are sexually active with more than one person.  You don't use condoms every time.  You or a partner was diagnosed with a sexually transmitted infection.  If you are at risk for HIV, ask about PrEP medicine to prevent HIV.  Get tested for HIV at least once in your life, whether you are at risk for HIV or not.  Cancer screening tests  Cervical cancer screening: If you have a cervix, begin getting regular cervical cancer screening tests starting at age 21.  Breast cancer scan (mammogram): If you've ever had breasts, begin having regular mammograms starting at age 40. This is a scan to check for breast cancer.  Colon cancer screening: It is important to start screening for colon cancer at age 45.  Have a colonoscopy test every 10 years (or more often if you're at risk) Or, ask your provider about stool tests like a FIT test every year or Cologuard test every 3 years.  To learn more about your testing options, visit:   .  For help making a decision, visit:   https://bit.ly/vj43685.  Prostate cancer screening test: If you have a prostate, ask your care team if a prostate cancer screening test (PSA) at age 55 is right for you.  Lung cancer screening: If you are a current or former smoker ages 50 to 80, ask your care team if ongoing lung cancer screenings are right for you.  For informational purposes only. Not to replace the advice of your health care provider. Copyright   2023 [employer]. All rights reserved. Clinically reviewed by the [facility]. Only-apartments 789632 - REV 01/24.

## 2025-01-14 ENCOUNTER — PATIENT OUTREACH (OUTPATIENT)
Dept: CARE COORDINATION | Facility: CLINIC | Age: 65
End: 2025-01-14
Payer: COMMERCIAL

## 2025-03-24 ENCOUNTER — TELEPHONE (OUTPATIENT)
Dept: FAMILY MEDICINE | Facility: CLINIC | Age: 65
End: 2025-03-24
Payer: COMMERCIAL

## 2025-03-24 NOTE — TELEPHONE ENCOUNTER
General Call      Reason for Call:  lab questions    What are your questions or concerns:  Patient has an appointment on 03/31/25 and is wondering if she has to come in before that date for fasting labs or just regular labs that are not fasting    Date of last appointment with provider: n/a    Could we send this information to you in [facility] or would you prefer to receive a phone call?:   Patient would like to be contacted via [facility]

## 2025-03-25 NOTE — TELEPHONE ENCOUNTER
RN called patient and per the review of Dr. Han's notes there are not additional labs that are indicated to evaluate other than the Hemoglobin A1c.    Cindy Flanagan RN on 3/25/2025 at 11:07 AM

## 2025-03-31 ENCOUNTER — OFFICE VISIT (OUTPATIENT)
Dept: FAMILY MEDICINE | Facility: CLINIC | Age: 65
End: 2025-03-31
Payer: COMMERCIAL

## 2025-03-31 VITALS
DIASTOLIC BLOOD PRESSURE: 70 MMHG | TEMPERATURE: 97 F | SYSTOLIC BLOOD PRESSURE: 118 MMHG | OXYGEN SATURATION: 99 % | WEIGHT: 161 LBS | HEIGHT: 65 IN | HEART RATE: 68 BPM | BODY MASS INDEX: 26.82 KG/M2

## 2025-03-31 DIAGNOSIS — M85.89 OSTEOPENIA OF MULTIPLE SITES: Primary | ICD-10-CM

## 2025-03-31 PROCEDURE — 3078F DIAST BP <80 MM HG: CPT | Performed by: FAMILY MEDICINE

## 2025-03-31 PROCEDURE — 3074F SYST BP LT 130 MM HG: CPT | Performed by: FAMILY MEDICINE

## 2025-03-31 PROCEDURE — 1126F AMNT PAIN NOTED NONE PRSNT: CPT | Performed by: FAMILY MEDICINE

## 2025-03-31 PROCEDURE — 99214 OFFICE O/P EST MOD 30 MIN: CPT | Performed by: FAMILY MEDICINE

## 2025-03-31 RX ORDER — ALENDRONATE SODIUM 70 MG/1
70 TABLET ORAL
Qty: 12 TABLET | Refills: 3 | Status: SHIPPED | OUTPATIENT
Start: 2025-03-31

## 2025-03-31 ASSESSMENT — PAIN SCALES - GENERAL: PAINLEVEL_OUTOF10: NO PAIN (0)

## 2025-03-31 NOTE — PATIENT INSTRUCTIONS
"    When you are out of refills or the refills say \"zero\", it is time to schedule your next appointment in clinic!    Labs are released to you almost immediately and sometimes before I have had a chance to review them.  I review labs regularly and once they are all in, you will either be sent a letter with your results or if you are signed up for on-line services, you will be notified that results are available to you on [employer]. If there are serious findings, you typically will be called.    If you have any questions about your visit, your symptoms, your medication, your test results or it is not clear what your diagnosis or treatment plan is please contact me (via [employer]) or call the care team at [phone] and say \"Care Team\"          "

## 2025-03-31 NOTE — PROGRESS NOTES
Assessment & Plan     Osteopenia of multiple sites  Risk of fracture meets WHO recommendation for treatment.  Risks, benefits and alternatives discussed   Start alendronate 70 mg weekly.   - alendronate (FOSAMAX) 70 MG tablet; Take 1 tablet (70 mg) by mouth every 7 days.    Nadiya Powers is a 64 year old, presenting for the following health issues:  Osteoporosis        3/31/2025     8:14 AM   Additional Questions   Roomed by Irene erazo CMA   Accompanied by Self     HPI      - Follow up after DEXA scan completed 2/20/2025. She is taking calcium with vitamin D.    EXAM: DX AXIAL HIPS/SPINE  LOCATION: [facility]  DATE: 2/20/2025     INDICATION: BMD screening, baseline.  DEMOGRAPHICS: Age- 64 years. Gender- Female. Menopausal status- Postmenopausal.  COMPARISON: No prior studies available on the current scanner.  TECHNIQUE: Dual-energy x-ray absorptiometry (DXA) performed with routine technique.      FINDINGS:     DXA RESULTS  -Lumbar Spine: L1-L4: BMD: 0.954 g/cm2. T-score: -1.9. Z-score: -0.6.   -RIGHT Hip Total: BMD: 0.732 g/cm2. T-score: -2.2. Z-score: -1.2.  -RIGHT Hip Femoral neck: BMD: 0.729 g/cm2. T-score: -2.2. Z-score: -0.9.  -LEFT Hip Total: BMD: 0.753 g/cm2. T-score: -2.0. Z-score: -1.0.  -LEFT Hip Femoral neck: BMD: 0.767 g/cm2. T-score: -1.9. Z-score: -0.7.     WHO T-SCORE CRITERIA  -Normal: T score at or above -1 SD  -Osteopenia: T score between -1 and -2.5 SD  -Osteoporosis: T score at or below -2.5 SD     The World Health Organization (WHO) criteria is applicable to perimenopausal females, postmenopausal females, and men aged 50 years or older.     FRACTURE RISK  -FRAX Results: The 10 year probability of major osteoporotic fracture is 21.6%, and of hip fracture is 2.1%, based on right femoral neck BMD.     RECOMMENDATIONS  The current National Osteoporosis Foundation Guide recommends that FDA-approved medical therapies be considered if the 10 year probability of major  "osteoporotic fracture risk is greater than or equal to 20%, or if the hip fracture risk is greater than or   equal to 3%. Please note all treatment decisions require clinical judgement and consideration of individual patient factors, including patient preferences, comorbidities, previous drug use, risk factors not captured in the FRAX model (e.g. frailty,   falls, vitamin D deficiency, increased bone turnover, interval significant decline in bone density) and possible under or over-estimation of fracture risk by FRAX.                                                                      IMPRESSION: Low bone density (OSTEOPENIA). T score meets the WHO criteria for low bone density (osteopenia) at one or more measured sites. The risk of osteoporotic fracture increases approximately two-fold for each standard deviation decrease in T-score.      Review of Systems  Constitutional, HEENT, cardiovascular, pulmonary, gi and gu systems are negative, except as otherwise noted.      Objective    /70   Pulse 68   Temp 97  F (36.1  C) (Tympanic)   Ht 1.638 m (5' 4.5\")   Wt 73 kg (161 lb)   SpO2 99%   BMI 27.21 kg/m    Body mass index is 27.21 kg/m .  Physical Exam   GENERAL: alert and no distress            Signed Electronically by: Manisha Han MD    "

## 2025-05-27 ENCOUNTER — E-VISIT (OUTPATIENT)
Dept: URGENT CARE | Facility: CLINIC | Age: 65
End: 2025-05-27
Payer: COMMERCIAL

## 2025-05-27 DIAGNOSIS — R30.0 DYSURIA: Primary | ICD-10-CM

## 2025-05-27 NOTE — PATIENT INSTRUCTIONS
Dear Priya Krishnan,     After reviewing your responses, I would like you to come in for a urine test to make sure we treat you correctly. This urine test is to evaluate you for a possible urinary tract infection, and should be scheduled for today or tomorrow. Schedule a Lab Only appointment here.     Lab appointments are not available at most locations on the weekends. If no Lab Only appointment is available, you should be seen in any of our convenient Walk-in or Urgent Care Centers, which can be found on our website here.     You will receive instructions with your results in [facility] once they are available.     If your symptoms worsen, you develop pain in your back or stomach, develop fevers, or are not improving in 5 days, please contact your primary care provider for an appointment or visit a Walk-in or Urgent Care Center to be seen.     Thanks again for choosing us as your health care partner,     HILARY Chauhan CNP

## 2025-05-28 ENCOUNTER — LAB (OUTPATIENT)
Dept: LAB | Facility: CLINIC | Age: 65
End: 2025-05-28
Payer: COMMERCIAL

## 2025-05-28 ENCOUNTER — RESULTS FOLLOW-UP (OUTPATIENT)
Dept: URGENT CARE | Facility: CLINIC | Age: 65
End: 2025-05-28

## 2025-05-28 DIAGNOSIS — R30.0 DYSURIA: ICD-10-CM

## 2025-05-28 DIAGNOSIS — N30.00 ACUTE CYSTITIS WITHOUT HEMATURIA: Primary | ICD-10-CM

## 2025-05-28 LAB
ALBUMIN UR-MCNC: ABNORMAL MG/DL
APPEARANCE UR: CLEAR
BACTERIA #/AREA URNS HPF: ABNORMAL /HPF
BILIRUB UR QL STRIP: NEGATIVE
CLUE CELLS: ABNORMAL
COLOR UR AUTO: YELLOW
GLUCOSE UR STRIP-MCNC: NEGATIVE MG/DL
HGB UR QL STRIP: ABNORMAL
KETONES UR STRIP-MCNC: NEGATIVE MG/DL
LEUKOCYTE ESTERASE UR QL STRIP: ABNORMAL
MUCOUS THREADS #/AREA URNS LPF: PRESENT /LPF
NITRATE UR QL: NEGATIVE
PH UR STRIP: 7 [PH] (ref 5–7)
RBC #/AREA URNS AUTO: ABNORMAL /HPF
SP GR UR STRIP: 1.02 (ref 1–1.03)
SQUAMOUS #/AREA URNS AUTO: ABNORMAL /LPF
TRICHOMONAS, WET PREP: ABNORMAL
UROBILINOGEN UR STRIP-ACNC: 0.2 E.U./DL
WBC #/AREA URNS AUTO: ABNORMAL /HPF
WBC CLUMPS #/AREA URNS HPF: PRESENT /HPF
WBC'S/HIGH POWER FIELD, WET PREP: ABNORMAL
YEAST, WET PREP: ABNORMAL

## 2025-05-28 PROCEDURE — 87210 SMEAR WET MOUNT SALINE/INK: CPT

## 2025-05-28 PROCEDURE — 87086 URINE CULTURE/COLONY COUNT: CPT

## 2025-05-28 PROCEDURE — 81001 URINALYSIS AUTO W/SCOPE: CPT

## 2025-05-28 RX ORDER — SULFAMETHOXAZOLE AND TRIMETHOPRIM 800; 160 MG/1; MG/1
1 TABLET ORAL 2 TIMES DAILY
Qty: 6 TABLET | Refills: 0 | Status: SHIPPED | OUTPATIENT
Start: 2025-05-28 | End: 2025-05-31

## 2025-05-29 LAB — BACTERIA UR CULT: NORMAL

## 2025-06-13 ENCOUNTER — RESULTS FOLLOW-UP (OUTPATIENT)
Dept: FAMILY MEDICINE | Facility: CLINIC | Age: 65
End: 2025-06-13

## 2025-06-13 DIAGNOSIS — N30.00 ACUTE CYSTITIS WITHOUT HEMATURIA: Primary | ICD-10-CM

## 2025-06-16 ENCOUNTER — RESULTS FOLLOW-UP (OUTPATIENT)
Dept: FAMILY MEDICINE | Facility: CLINIC | Age: 65
End: 2025-06-16

## 2025-06-16 RX ORDER — CEPHALEXIN 500 MG/1
500 CAPSULE ORAL 2 TIMES DAILY
Qty: 20 CAPSULE | Refills: 0 | Status: SHIPPED | OUTPATIENT
Start: 2025-06-16 | End: 2025-06-26

## 2025-07-14 ENCOUNTER — OFFICE VISIT (OUTPATIENT)
Dept: PEDIATRICS | Facility: CLINIC | Age: 65
End: 2025-07-14
Payer: COMMERCIAL

## 2025-07-14 ENCOUNTER — PATIENT OUTREACH (OUTPATIENT)
Dept: CARE COORDINATION | Facility: CLINIC | Age: 65
End: 2025-07-14

## 2025-07-14 ENCOUNTER — NURSE TRIAGE (OUTPATIENT)
Dept: FAMILY MEDICINE | Facility: CLINIC | Age: 65
End: 2025-07-14

## 2025-07-14 ENCOUNTER — HOSPITAL ENCOUNTER (OUTPATIENT)
Dept: CT IMAGING | Facility: CLINIC | Age: 65
Discharge: HOME | End: 2025-07-14
Attending: NURSE PRACTITIONER | Admitting: NURSE PRACTITIONER
Payer: COMMERCIAL

## 2025-07-14 VITALS
SYSTOLIC BLOOD PRESSURE: 129 MMHG | OXYGEN SATURATION: 100 % | DIASTOLIC BLOOD PRESSURE: 78 MMHG | TEMPERATURE: 97.1 F | WEIGHT: 160 LBS | HEART RATE: 70 BPM | HEIGHT: 65 IN | BODY MASS INDEX: 26.66 KG/M2 | RESPIRATION RATE: 16 BRPM

## 2025-07-14 DIAGNOSIS — E78.5 HYPERLIPIDEMIA LDL GOAL <100: ICD-10-CM

## 2025-07-14 DIAGNOSIS — I66.02 STENOSIS OF LEFT MIDDLE CEREBRAL ARTERY: ICD-10-CM

## 2025-07-14 DIAGNOSIS — H81.10 BENIGN PAROXYSMAL POSITIONAL VERTIGO, UNSPECIFIED LATERALITY: Primary | ICD-10-CM

## 2025-07-14 DIAGNOSIS — R26.89 IMBALANCE: ICD-10-CM

## 2025-07-14 DIAGNOSIS — R11.2 NAUSEA AND VOMITING, UNSPECIFIED VOMITING TYPE: ICD-10-CM

## 2025-07-14 DIAGNOSIS — R42 DIZZINESS: ICD-10-CM

## 2025-07-14 LAB
ALBUMIN SERPL BCG-MCNC: 4.5 G/DL (ref 3.5–5.2)
ALP SERPL-CCNC: 76 U/L (ref 40–150)
ALT SERPL W P-5'-P-CCNC: 19 U/L (ref 0–50)
ANION GAP SERPL CALCULATED.3IONS-SCNC: 13 MMOL/L (ref 7–15)
AST SERPL W P-5'-P-CCNC: 26 U/L (ref 0–45)
BASOPHILS # BLD AUTO: 0 10E3/UL (ref 0–0.2)
BASOPHILS NFR BLD AUTO: 1 %
BILIRUB SERPL-MCNC: 0.8 MG/DL
BUN SERPL-MCNC: 12.1 MG/DL (ref 8–23)
CALCIUM SERPL-MCNC: 9.8 MG/DL (ref 8.8–10.4)
CHLORIDE SERPL-SCNC: 104 MMOL/L (ref 98–107)
CREAT SERPL-MCNC: 0.73 MG/DL (ref 0.51–0.95)
EGFRCR SERPLBLD CKD-EPI 2021: >90 ML/MIN/1.73M2
EOSINOPHIL # BLD AUTO: 0.2 10E3/UL (ref 0–0.7)
EOSINOPHIL NFR BLD AUTO: 5 %
ERYTHROCYTE [DISTWIDTH] IN BLOOD BY AUTOMATED COUNT: 13 % (ref 10–15)
GLUCOSE SERPL-MCNC: 152 MG/DL (ref 70–99)
HCO3 SERPL-SCNC: 22 MMOL/L (ref 22–29)
HCT VFR BLD AUTO: 42.1 % (ref 35–47)
HGB BLD-MCNC: 13.8 G/DL (ref 11.7–15.7)
IMM GRANULOCYTES # BLD: 0 10E3/UL
IMM GRANULOCYTES NFR BLD: 0 %
LYMPHOCYTES # BLD AUTO: 0.9 10E3/UL (ref 0.8–5.3)
LYMPHOCYTES NFR BLD AUTO: 22 %
MCH RBC QN AUTO: 28.8 PG (ref 26.5–33)
MCHC RBC AUTO-ENTMCNC: 32.8 G/DL (ref 31.5–36.5)
MCV RBC AUTO: 88 FL (ref 78–100)
MONOCYTES # BLD AUTO: 0.2 10E3/UL (ref 0–1.3)
MONOCYTES NFR BLD AUTO: 5 %
NEUTROPHILS # BLD AUTO: 2.7 10E3/UL (ref 1.6–8.3)
NEUTROPHILS NFR BLD AUTO: 68 %
PLATELET # BLD AUTO: 185 10E3/UL (ref 150–450)
POTASSIUM SERPL-SCNC: 4.3 MMOL/L (ref 3.4–5.3)
PROT SERPL-MCNC: 7.4 G/DL (ref 6.4–8.3)
RBC # BLD AUTO: 4.79 10E6/UL (ref 3.8–5.2)
SODIUM SERPL-SCNC: 139 MMOL/L (ref 135–145)
TSH SERPL DL<=0.005 MIU/L-ACNC: 1.45 UIU/ML (ref 0.3–4.2)
WBC # BLD AUTO: 4 10E3/UL (ref 4–11)

## 2025-07-14 PROCEDURE — 70450 CT HEAD/BRAIN W/O DYE: CPT | Mod: XU

## 2025-07-14 PROCEDURE — 80053 COMPREHEN METABOLIC PANEL: CPT | Performed by: NURSE PRACTITIONER

## 2025-07-14 PROCEDURE — 3074F SYST BP LT 130 MM HG: CPT | Performed by: NURSE PRACTITIONER

## 2025-07-14 PROCEDURE — 96374 THER/PROPH/DIAG INJ IV PUSH: CPT | Performed by: NURSE PRACTITIONER

## 2025-07-14 PROCEDURE — 3078F DIAST BP <80 MM HG: CPT | Performed by: NURSE PRACTITIONER

## 2025-07-14 PROCEDURE — 93005 ELECTROCARDIOGRAM TRACING: CPT | Performed by: NURSE PRACTITIONER

## 2025-07-14 PROCEDURE — 99215 OFFICE O/P EST HI 40 MIN: CPT | Mod: 25 | Performed by: NURSE PRACTITIONER

## 2025-07-14 PROCEDURE — 70496 CT ANGIOGRAPHY HEAD: CPT

## 2025-07-14 PROCEDURE — 84443 ASSAY THYROID STIM HORMONE: CPT | Performed by: NURSE PRACTITIONER

## 2025-07-14 PROCEDURE — 36415 COLL VENOUS BLD VENIPUNCTURE: CPT | Performed by: NURSE PRACTITIONER

## 2025-07-14 PROCEDURE — 85025 COMPLETE CBC W/AUTO DIFF WBC: CPT | Performed by: NURSE PRACTITIONER

## 2025-07-14 PROCEDURE — 96361 HYDRATE IV INFUSION ADD-ON: CPT | Performed by: NURSE PRACTITIONER

## 2025-07-14 PROCEDURE — 1126F AMNT PAIN NOTED NONE PRSNT: CPT | Performed by: NURSE PRACTITIONER

## 2025-07-14 PROCEDURE — 250N000009 HC RX 250: Performed by: NURSE PRACTITIONER

## 2025-07-14 PROCEDURE — 250N000011 HC RX IP 250 OP 636: Performed by: NURSE PRACTITIONER

## 2025-07-14 RX ORDER — ONDANSETRON 2 MG/ML
4 INJECTION INTRAMUSCULAR; INTRAVENOUS
Status: ACTIVE | OUTPATIENT
Start: 2025-07-14 | End: 2025-07-15

## 2025-07-14 RX ORDER — IOPAMIDOL 755 MG/ML
67 INJECTION, SOLUTION INTRAVASCULAR ONCE
Status: COMPLETED | OUTPATIENT
Start: 2025-07-14 | End: 2025-07-14

## 2025-07-14 RX ORDER — PROCHLORPERAZINE EDISYLATE 5 MG/ML
10 INJECTION INTRAMUSCULAR; INTRAVENOUS ONCE
Status: COMPLETED | OUTPATIENT
Start: 2025-07-14 | End: 2025-07-14

## 2025-07-14 RX ORDER — MECLIZINE HYDROCHLORIDE 25 MG/1
25 TABLET ORAL 3 TIMES DAILY PRN
Qty: 30 TABLET | Refills: 0 | Status: SHIPPED | OUTPATIENT
Start: 2025-07-14

## 2025-07-14 RX ADMIN — ONDANSETRON 4 MG: 2 INJECTION INTRAMUSCULAR; INTRAVENOUS at 14:46

## 2025-07-14 RX ADMIN — SODIUM CHLORIDE 100 ML: 9 INJECTION, SOLUTION INTRAVENOUS at 13:44

## 2025-07-14 RX ADMIN — ONDANSETRON 4 MG: 2 INJECTION INTRAMUSCULAR; INTRAVENOUS at 14:04

## 2025-07-14 RX ADMIN — Medication 1000 ML: at 13:13

## 2025-07-14 RX ADMIN — IOPAMIDOL 67 ML: 755 INJECTION, SOLUTION INTRAVENOUS at 13:44

## 2025-07-14 RX ADMIN — PROCHLORPERAZINE EDISYLATE 10 MG: 5 INJECTION INTRAMUSCULAR; INTRAVENOUS at 13:05

## 2025-07-14 ASSESSMENT — PAIN SCALES - GENERAL: PAINLEVEL_OUTOF10: NO PAIN (0)

## 2025-07-14 NOTE — PATIENT INSTRUCTIONS
"I believe  vertigo is the cause of your symptoms.  To help with the symptoms, we need to help your body shift the crystals of the inner ear back to the proper position.  To do this, you need to try an exercise at home.  There is a maneuver called the \"half somersault\" that can be very effective in relieving the symptoms of vertigo.  The link for a video on how to perform this is below.  It may take several repetitions of of the maneuver.  https://[employer]/aXV8y0QPrfe or go to YouTube and search Bhavani Hernández MD Vertigo treatment.  I have prescribed medication to help with the symptoms as well.  You may take this every 8 hours as needed, however it is not a replacement for trying the maneuvers to move the crystals back to the appropriate location in your inner ear.  Please follow up with your primary care provide in one week for a recheck of your symptoms.  If you develop severe worsening of the symptoms, fevers, weakness, vomiting not controlled by medication, or any other concerns, please be seen in the ER right away.    Benign Paroxysmal Positional Vertigo     Your health care provider may move your head in certain ways to treat your BPPV.     Benign paroxysmal positional vertigo (BPPV) is a problem with the inner ear. The inner ear contains the vestibular system. This system is what helps you keep your balance. BPPV causes a feeling of spinning. It is a common problem of the vestibular system.  Understanding the vestibular system  The vestibular system of the ear is made up of very tiny parts. They include the utricle, saccule, and semicircular canals. The utricle is a tiny organ that contains calcium crystals. In some people, the crystals can move into the semicircular canals. When this happens, the system no longer works as it should. This causes BPPV. Benign means it is not life threatening. Paroxysmal means it happens suddenly. Positional means that it happens when you move your head. Vertigo is a feeling of " spinning.  What causes BPPV?  Causes include injury to your head or neck. Other problems with the vestibular system may cause BPPV. In many people, the cause of BPPV is not known.  Symptoms of BPPV  You many have repeated feelings of spinning (vertigo). The vertigo usually lasts less than 1 minute. Some movements, such as rolling over in bed, can bring on vertigo.  Diagnosing BPPV  Your primary healthcare provider may diagnose and treat your BPPV. Or you may see an ear, nose, and throat doctor (otolaryngologist). In some cases, you may see a nervous system doctor (neurologist).  The healthcare provider will ask about your symptoms and your medical history. He or she will examine you. You may have hearing and balance tests. As part of the exam, your healthcare provider may have you move your head and body in certain ways. If you have BPPV, the movements can bring on vertigo. Your provider will also look for abnormal movements of your eyes. You may have other tests to check your vestibular or nervous systems.  Treatment for BPPV  Your healthcare provider may try to move the calcium crystals. This is done by having you move your head and neck in certain ways. This treatment is safe and often works well. You may also be told to do these movements at home. You may still have vertigo for a few weeks. Your healthcare provider will recheck your symptoms, usually in about a month. Special physical therapy may also be part of treatment. In rare cases, surgery may be needed for BPPV that does not go away.     When to call the healthcare provider  Call your healthcare provider right away if you have any of these:  Symptoms that do not go away with treatment  Symptoms that get worse  New symptoms   Date Last Reviewed: 5/1/2017

## 2025-07-14 NOTE — PROGRESS NOTES
Acute and Diagnostic Services Clinic Visit    Assessment & Plan     Benign paroxysmal positional vertigo, unspecified laterality  Imbalance  Nausea and vomiting, unspecified vomiting type  See HPI for further details. No focal neuro deficits, does have hx of vertigo however this is the worst she has ever experienced, does have sensation of imbalance as well, so workup pursued. Labs are unremarkable. EKG is without ischemic changes or ectopy. CT/CTA head/neck without evidence of lesion, high grade stenosis. I do think this is a BPPV, however she is unable to tolerate Robert Hallpike in ADS today as she has significant room spinning sensation when she sits upright. Did attempt to alleviate symptoms with IV NS, compazine, Zofran. Did have improvement in nausea/vomiting, continues to have some vertigo. We will have her attempt half somersault maneuver at home, but did place PT referral as well. Can use as needed meclizine. PCP follow up for recheck, discussed reasons to seek emergent evaluation.   - CBC with platelets differential; Future  - Comprehensive metabolic panel; Future  - TSH with free T4 reflex; Future  - sodium chloride 0.9% BOLUS 1,000 mL  - prochlorperazine injection 10 mg  - EKG 12-lead, tracing only  - CT Head w/o Contrast; Future  - CTA Head Neck w Contrast; Future  - sodium chloride (PF) 0.9% PF flush 3 mL  - Physical Therapy  Referral; Future  - meclizine (ANTIVERT) 25 MG tablet; Take 1 tablet (25 mg) by mouth 3 times daily as needed for dizziness.  - ondansetron (ZOFRAN) injection 4 mg    Stenosis of left middle cerebral artery  Moderate narrowing of M2 segment of left MCA. I think this is an incidental finding as she is not having right sided weakness, visual cuts, sensory loss or language disturbances. Discussed may want to consider starting statin, ASA, but would defer this discussion to PCP.     40 minutes were spent doing chart review, history and exam, documentation and further activities  "per the note.     BMI  Estimated body mass index is 27.04 kg/m  as calculated from the following:    Height as of this encounter: 1.638 m (5' 4.5\").    Weight as of this encounter: 72.6 kg (160 lb).       Follow-up  Return in about 1 week (around 7/21/2025) for Recheck of symptoms.    Subjective   Priya is a 65 year old, presenting for the following health issues:  Vertigo    HPI      Dizziness  Onset/Duration: just overnight  Description:   Do you feel faint: No  Does it feel like the surroundings (bed, room) are moving: YES  Unsteady/off balance: YES  Have you passed out or fallen: No  Intensity: severe  Progression of Symptoms: constant  Accompanying Signs & Symptoms:  Heart palpitations or chest pain: No  Nausea, vomiting: YES  Weakness or lack of coordination in arms or legs: No  Vision or speech changes: No  Numbness or tingling: No  Ringing in ears (Tinnitus): No  Hearing Loss: No  History:   Head trauma/concussion history: fell on ice and hit head a couple years ago, was not seen for this  Previous similar symptoms: YES- but this is the worst it has ever been  Recent bleeding history: No  Any new medications (BP?): No  Precipitating factors:   Worse with activity: YES  Worse with head movement: YES  Alleviating factors:   Does staying in a fixed position give relief: no   Therapies tried and outcome: None    Onset of room spinning dizziness overnight, much worse when she woke up this morning and rolled over in bed. 2 episodes of vomiting this morning. Denies diplopia, blurred vision, no HA, no chest pain, palpitations, racing heart beat. Does feel imbalanced while walking. No focal or unilateral weakness. No difficulty with word finding, confusion. Mental status is normal to her  who is here with her today. Attempted Epley at home today as she has been seen for BPPV in the past, not helpful. Notes that while she has had symptoms similar to this in the past, this is the worst she has ever " "felt.    Review of Systems  Constitutional, neuro, ENT, endocrine, pulmonary, cardiac, gastrointestinal, genitourinary, musculoskeletal, integument and psychiatric systems are negative, except as otherwise noted.      Objective    /78   Pulse 70   Temp 97.1  F (36.2  C) (Tympanic)   Resp 16   Ht 1.638 m (5' 4.5\")   Wt 72.6 kg (160 lb)   SpO2 100%   BMI 27.04 kg/m    Body mass index is 27.04 kg/m .  Physical Exam  Vitals and nursing note reviewed.   Constitutional:       Appearance: Normal appearance.   HENT:      Head: Normocephalic and atraumatic.      Mouth/Throat:      Mouth: Mucous membranes are moist.   Eyes:      Comments: Non-icteric   Cardiovascular:      Rate and Rhythm: Normal rate and regular rhythm.      Pulses: Normal pulses.      Heart sounds: Normal heart sounds, S1 normal and S2 normal. Heart sounds not distant. No murmur heard.     No friction rub. No gallop.   Pulmonary:      Effort: Pulmonary effort is normal.      Breath sounds: Normal breath sounds.   Abdominal:      General: Abdomen is flat. Bowel sounds are normal.      Palpations: Abdomen is soft.   Musculoskeletal:      Cervical back: Neck supple.      Right lower leg: No edema.      Left lower leg: No edema.   Skin:     General: Skin is warm and dry.      Capillary Refill: Capillary refill takes less than 2 seconds.   Neurological:      General: No focal deficit present.      Mental Status: She is alert and oriented to person, place, and time.      Cranial Nerves: Cranial nerves 2-12 are intact.      Sensory: Sensation is intact.      Motor: Motor function is intact.      Coordination: Coordination is intact.      Gait: Gait is intact.      Comments: Unable to tolerate Robert Hallpike, vertigo occurs as soon as she sits upright.   Psychiatric:         Mood and Affect: Mood normal.         Behavior: Behavior normal.         Thought Content: Thought content normal.         Judgment: Judgment normal.            EKG - Reviewed and " interpreted by me appears normal, NSR, normal axis, normal intervals, no acute ST/T changes c/w ischemia, no LVH by voltage criteria, there are no prior tracings available  Recent Results (from the past 24 hours)   CBC with platelets differential    Narrative    The following orders were created for panel order CBC with platelets differential.  Procedure                               Abnormality         Status                     ---------                               -----------         ------                     CBC with platelets and ...[[id number]]                      Final result                 Please view results for these tests on the individual orders.   Comprehensive metabolic panel   Result Value Ref Range    Sodium 139 135 - 145 mmol/L    Potassium 4.3 3.4 - 5.3 mmol/L    Carbon Dioxide (CO2) 22 22 - 29 mmol/L    Anion Gap 13 7 - 15 mmol/L    Urea Nitrogen 12.1 8.0 - 23.0 mg/dL    Creatinine 0.73 0.51 - 0.95 mg/dL    GFR Estimate >90 >60 mL/min/1.73m2    Calcium 9.8 8.8 - 10.4 mg/dL    Chloride 104 98 - 107 mmol/L    Glucose 152 (H) 70 - 99 mg/dL    Alkaline Phosphatase 76 40 - 150 U/L    AST 26 0 - 45 U/L    ALT 19 0 - 50 U/L    Protein Total 7.4 6.4 - 8.3 g/dL    Albumin 4.5 3.5 - 5.2 g/dL    Bilirubin Total 0.8 <=1.2 mg/dL   TSH with free T4 reflex   Result Value Ref Range    TSH 1.45 0.30 - 4.20 uIU/mL   CBC with platelets and differential   Result Value Ref Range    WBC Count 4.0 4.0 - 11.0 10e3/uL    RBC Count 4.79 3.80 - 5.20 10e6/uL    Hemoglobin 13.8 11.7 - 15.7 g/dL    Hematocrit 42.1 35.0 - 47.0 %    MCV 88 78 - 100 fL    MCH 28.8 26.5 - 33.0 pg    MCHC 32.8 31.5 - 36.5 g/dL    RDW 13.0 10.0 - 15.0 %    Platelet Count 185 150 - 450 10e3/uL    % Neutrophils 68 %    % Lymphocytes 22 %    % Monocytes 5 %    % Eosinophils 5 %    % Basophils 1 %    % Immature Granulocytes 0 %    Absolute Neutrophils 2.7 1.6 - 8.3 10e3/uL    Absolute Lymphocytes 0.9 0.8 - 5.3 10e3/uL    Absolute Monocytes 0.2 0.0  - 1.3 10e3/uL    Absolute Eosinophils 0.2 0.0 - 0.7 10e3/uL    Absolute Basophils 0.0 0.0 - 0.2 10e3/uL    Absolute Immature Granulocytes 0.0 <=0.4 10e3/uL   CT Head w/o Contrast    Narrative    EXAM: CT HEAD W/O CONTRAST  LOCATION: [facility]  DATE: 7/14/2025    INDICATION: severe dizzines and imbalance  COMPARISON: None.  TECHNIQUE: Routine CT Head without IV contrast. Multiplanar reformats. Dose reduction techniques were used.    FINDINGS:  INTRACRANIAL CONTENTS: No intracranial hemorrhage, extraaxial collection, or mass effect.  No CT evidence of acute infarct. Normal parenchymal attenuation. Normal ventricles and sulci.     VISUALIZED ORBITS/SINUSES/MASTOIDS: No intraorbital abnormality. Mild to moderate chronic mucosal thickening of the maxillary sinuses, the sphenoid sinuses and ethmoid air cells. No middle ear or mastoid effusion.    BONES/SOFT TISSUES: No acute abnormality.      Impression    IMPRESSION:  1.  No intracranial hemorrhage, mass lesions, hydrocephalus or CT evidence for an acute infarct.  2.  Mild to moderate chronic mucosal thickening of the maxillary sinuses, the sphenoid sinuses and ethmoid air cells.     CTA Head Neck w Contrast    Narrative    EXAM: CTA HEAD NECK W CONTRAST  LOCATION: [facility]  DATE: 7/14/2025    INDICATION: severe dizziness and imbalance  COMPARISON: None.  CONTRAST: 67 mL Isovue 370  TECHNIQUE: Head and neck CT angiogram with IV contrast. Axial helical CT images of the head and neck vessels obtained during the arterial phase of intravenous contrast administration. Axial 2D reconstructed images and multiplanar 3D MIP reconstructed   images of the head and neck vessels were performed by the technologist. Dose reduction techniques were used. All stenosis measurements made according to NASCET criteria unless otherwise specified.    FINDINGS:   HEAD CTA:  ANTERIOR CIRCULATION: [Moderate narrowing of the proximal M2  segment of the left middle cerebral artery. The rest of the anterior circulation vessels are patent without hemodynamically significant stenosis. Standard [place] anatomy.    POSTERIOR CIRCULATION: The intracranial vertebral arteries, the basilar artery and the bilateral patent without hemodynamically significant stenosis. Dominant right and smaller left vertebral artery contribute to a normal basilar artery.     DURAL VENOUS SINUSES: Expected enhancement of the major dural venous sinuses.    NECK CTA:  RIGHT CAROTID: No measurable stenosis or dissection.    LEFT CAROTID: No measurable stenosis or dissection.    VERTEBRAL ARTERIES: No focal stenosis or dissection. Dominant right and smaller left vertebral arteries.    AORTIC ARCH: Classic aortic arch anatomy with no significant stenosis at the origin of the great vessels.    NONVASCULAR STRUCTURES: The lung apices are clear.      Impression    IMPRESSION:   HEAD CTA:  1.  Moderate narrowing of the proximal M2 segment of the left middle cerebral artery.  2.  Otherwise no high-grade stenoses or occlusion of the major intracranial arteries. No intracranial aneurysms.    NECK CTA:  1.  No hemodynamically significant narrowing of the internal carotid arteries bilaterally based on the NASCET criteria.  2.  The vertebral arteries are patent throughout their course in the neck.       No results found for this visit on 07/14/25.  CTA Head Neck w Contrast  Result Date: 7/14/2025  EXAM: CTA HEAD NECK W CONTRAST LOCATION: [facility] DATE: 7/14/2025 INDICATION: severe dizziness and imbalance COMPARISON: None. CONTRAST: 67 mL Isovue 370 TECHNIQUE: Head and neck CT angiogram with IV contrast. Axial helical CT images of the head and neck vessels obtained during the arterial phase of intravenous contrast administration. Axial 2D reconstructed images and multiplanar 3D MIP reconstructed images of the head and neck vessels were performed by the  technologist. Dose reduction techniques were used. All stenosis measurements made according to NASCET criteria unless otherwise specified. FINDINGS: HEAD CTA: ANTERIOR CIRCULATION: [Moderate narrowing of the proximal M2 segment of the left middle cerebral artery. The rest of the anterior circulation vessels are patent without hemodynamically significant stenosis. Standard [place] anatomy. POSTERIOR CIRCULATION: The intracranial vertebral arteries, the basilar artery and the bilateral patent without hemodynamically significant stenosis. Dominant right and smaller left vertebral artery contribute to a normal basilar artery. DURAL VENOUS SINUSES: Expected enhancement of the major dural venous sinuses. NECK CTA: RIGHT CAROTID: No measurable stenosis or dissection. LEFT CAROTID: No measurable stenosis or dissection. VERTEBRAL ARTERIES: No focal stenosis or dissection. Dominant right and smaller left vertebral arteries. AORTIC ARCH: Classic aortic arch anatomy with no significant stenosis at the origin of the great vessels. NONVASCULAR STRUCTURES: The lung apices are clear.     IMPRESSION: HEAD CTA: 1.  Moderate narrowing of the proximal M2 segment of the left middle cerebral artery. 2.  Otherwise no high-grade stenoses or occlusion of the major intracranial arteries. No intracranial aneurysms. NECK CTA: 1.  No hemodynamically significant narrowing of the internal carotid arteries bilaterally based on the NASCET criteria. 2.  The vertebral arteries are patent throughout their course in the neck.     CT Head w/o Contrast  Result Date: 7/14/2025  EXAM: CT HEAD W/O CONTRAST LOCATION: [facility] DATE: 7/14/2025 INDICATION: severe dizzines and imbalance COMPARISON: None. TECHNIQUE: Routine CT Head without IV contrast. Multiplanar reformats. Dose reduction techniques were used. FINDINGS: INTRACRANIAL CONTENTS: No intracranial hemorrhage, extraaxial collection, or mass effect.  No CT evidence  of acute infarct. Normal parenchymal attenuation. Normal ventricles and sulci. VISUALIZED ORBITS/SINUSES/MASTOIDS: No intraorbital abnormality. Mild to moderate chronic mucosal thickening of the maxillary sinuses, the sphenoid sinuses and ethmoid air cells. No middle ear or mastoid effusion. BONES/SOFT TISSUES: No acute abnormality.     IMPRESSION: 1.  No intracranial hemorrhage, mass lesions, hydrocephalus or CT evidence for an acute infarct. 2.  Mild to moderate chronic mucosal thickening of the maxillary sinuses, the sphenoid sinuses and ethmoid air cells.         Signed Electronically by: HILARY Mason CNP

## 2025-07-14 NOTE — TELEPHONE ENCOUNTER
Nurse Triage SBAR    Is this a 2nd Level Triage? YES, LICENSED PRACTITIONER REVIEW IS REQUIRED    Situation: Patient calling with symptoms of vertigo    Background: Patient previously has had vertigo. In the past went to PT and had the Epley maneuver done    Assessment: Patient denies chest pain, weakness of extremities or fever. Patient states symptoms worsen moving head from R to L or L to R. Patient woke up with symptoms this morning. Dizziness is causing nausea.    Protocol Recommended Disposition:   Call ADS/Go to ED/UCC Now (Or To Office with PCP Approval)    Recommendation: Routing to ADS to review.     Update: patient scheduled in ADS today.      Does the patient meet one of the following criteria for ADS visit consideration? 16+ years old, with an [facility] PCP     TIP  Providers, please consider if this condition is appropriate for management at one of our Acute and Diagnostic Services sites.     If patient is a good candidate, please use dotphrase <dot>triageresponse and select Refer to ADS to document.  Reason for Disposition   SEVERE dizziness (e.g., unable to stand, requires support to walk, feels like passing out now)    Additional Information   Negative: SEVERE difficulty breathing (e.g., struggling for each breath, speaks in single words)   Negative: Shock suspected (e.g., cold/pale/clammy skin, too weak to stand, low BP, rapid pulse)   Negative: Difficult to awaken or acting confused (e.g., disoriented, slurred speech)   Negative: Fainted, and still feels dizzy afterwards   Negative: Overdose (accidental or intentional) of medications   Negative: New neurologic deficit that is present now: * Weakness of the face, arm, or leg on one side of the body * Numbness of the face, arm, or leg on one side of the body * Loss of speech or garbled speech   Negative: Heart beating < 50 beats per minute OR > 140 beats per minute   Negative: Sounds like a life-threatening emergency to the triager   Negative: Chest  "pain   Negative: Rectal bleeding, bloody stool, or tarry-black stool   Negative: Vomiting is main symptom   Negative: Diarrhea is main symptom   Negative: Headache is main symptom   Negative: Heat exhaustion suspected (i.e., dehydration from heat exposure)   Negative: Patient states that they are having an anxiety or panic attack   Negative: Dizziness from low blood sugar (i.e., < 60 mg/dl or 3.5 mmol/l)    Answer Assessment - Initial Assessment Questions  1. DESCRIPTION: \"Describe your dizziness.\"      Vertigo  2. LIGHTHEADED: \"Do you feel lightheaded?\" (e.g., somewhat faint, woozy, weak upon standing)      Whirling sensation from moving head side to side  3. VERTIGO: \"Do you feel like either you or the room is spinning or tilting?\" (i.e. vertigo)      Yes  4. SEVERITY: \"How bad is it?\"  \"Do you feel like you are going to faint?\" \"Can you stand and walk?\"    - MILD: Feels slightly dizzy, but walking normally.    - MODERATE: Feels unsteady when walking, but not falling; interferes with normal activities (e.g., school, work).    - SEVERE: Unable to walk without falling, or requires assistance to walk without falling; feels like passing out now.       Severe  5. ONSET:  \"When did the dizziness begin?\"      This morning  6. AGGRAVATING FACTORS: \"Does anything make it worse?\" (e.g., standing, change in head position)      Changing head position  7. HEART RATE: \"Can you tell me your heart rate?\" \"How many beats in 15 seconds?\"  (Note: not all patients can do this)        WNL  8. CAUSE: \"What do you think is causing the dizziness?\"      Vertigo  9. RECURRENT SYMPTOM: \"Have you had dizziness before?\" If Yes, ask: \"When was the last time?\" \"What happened that time?\"      Yes, vertigo in the past. Went to PT and did the maneuver  10. OTHER SYMPTOMS: \"Do you have any other symptoms?\" (e.g., fever, chest pain, vomiting, diarrhea, bleeding)        Nausea  11. PREGNANCY: \"Is there any chance you are pregnant?\" \"When was your " "last menstrual period?\"        N/A    Protocols used: Dizziness-A-VELVET CALERO RN  [facility]    "

## 2025-07-17 ENCOUNTER — RESULTS FOLLOW-UP (OUTPATIENT)
Dept: FAMILY MEDICINE | Facility: CLINIC | Age: 65
End: 2025-07-17

## 2025-07-17 ENCOUNTER — OFFICE VISIT (OUTPATIENT)
Dept: FAMILY MEDICINE | Facility: CLINIC | Age: 65
End: 2025-07-17
Payer: COMMERCIAL

## 2025-07-17 VITALS
HEIGHT: 65 IN | WEIGHT: 154.13 LBS | RESPIRATION RATE: 16 BRPM | DIASTOLIC BLOOD PRESSURE: 74 MMHG | SYSTOLIC BLOOD PRESSURE: 124 MMHG | BODY MASS INDEX: 25.68 KG/M2 | OXYGEN SATURATION: 99 % | TEMPERATURE: 97.3 F | HEART RATE: 68 BPM

## 2025-07-17 DIAGNOSIS — H81.10 BENIGN PAROXYSMAL POSITIONAL VERTIGO, UNSPECIFIED LATERALITY: Primary | ICD-10-CM

## 2025-07-17 DIAGNOSIS — N39.0 RECURRENT UTI: ICD-10-CM

## 2025-07-17 DIAGNOSIS — Z12.31 VISIT FOR SCREENING MAMMOGRAM: ICD-10-CM

## 2025-07-17 DIAGNOSIS — I66.02 STENOSIS OF LEFT MIDDLE CEREBRAL ARTERY: ICD-10-CM

## 2025-07-17 DIAGNOSIS — E78.5 HYPERLIPIDEMIA LDL GOAL <100: ICD-10-CM

## 2025-07-17 LAB
CHOLEST SERPL-MCNC: 252 MG/DL
HDLC SERPL-MCNC: 84 MG/DL
LDLC SERPL CALC-MCNC: 150 MG/DL
NONHDLC SERPL-MCNC: 168 MG/DL
TRIGL SERPL-MCNC: 91 MG/DL

## 2025-07-17 RX ORDER — ROSUVASTATIN CALCIUM 10 MG/1
10 TABLET, COATED ORAL DAILY
Qty: 90 TABLET | Refills: 3 | Status: SHIPPED | OUTPATIENT
Start: 2025-07-17

## 2025-07-17 RX ORDER — ASPIRIN 81 MG/1
81 TABLET, COATED ORAL DAILY
Qty: 30 TABLET | Refills: 3 | Status: SHIPPED | OUTPATIENT
Start: 2025-07-17

## 2025-07-17 RX ORDER — ESTRADIOL 0.5 MG/1
0.5 TABLET ORAL
Qty: 24 TABLET | Refills: 3 | Status: SHIPPED | OUTPATIENT
Start: 2025-07-17

## 2025-07-17 ASSESSMENT — PAIN SCALES - GENERAL: PAINLEVEL_OUTOF10: NO PAIN (0)

## 2025-07-17 NOTE — PROGRESS NOTES
"  Assessment & Plan     Benign paroxysmal positional vertigo, unspecified laterality   Resolved  Given the Dr. Bhavani Hernández vertigo treatment pfd for future use if needed    Stenosis of left middle cerebral artery  Likely incidental finding, asymptomatic  Now that we know this is present, however, I think it's prudent to treat with aspirin/statin.  Patient in agreement.   - Lipid panel reflex to direct LDL Non-fasting; Future  - aspirin (ASPIRIN LOW DOSE) 81 MG EC tablet; Take 1 tablet (81 mg) by mouth daily.  - rosuvastatin (CRESTOR) 10 MG tablet; Take 1 tablet (10 mg) by mouth daily.  - Lipid panel reflex to direct LDL Fasting; Future    Visit for screening mammogram     - MA Screening Bilateral w/ Micah; Future    Hyperlipidemia LDL goal <100  Start rosuvastatin 10 mg daily    - rosuvastatin (CRESTOR) 10 MG tablet; Take 1 tablet (10 mg) by mouth daily.  - Lipid panel reflex to direct LDL Fasting; Future    Recurrent UTI  Recurrent UTIs recently.  Vaginal dryness.    Risks, benefits and alternatives discussed   Start vaginal estrogen twice weekly at bedtime  Start over the counter cranberry pills  - estradiol (ESTRACE) 0.5 MG tablet; Place 1 tablet (0.5 mg) vaginally twice a week.    BMI  Estimated body mass index is 26.05 kg/m  as calculated from the following:    Height as of this encounter: 1.638 m (5' 4.5\").    Weight as of this encounter: 69.9 kg (154 lb 2 oz).           Nadiya Powers is a 65 year old, presenting for the following health issues:  Dizziness        7/17/2025     7:53 AM   Additional Questions   Roomed by Irene erazo CMA   Accompanied by Self     History of Present Illness       Reason for visit:  Vertigo follow up    She eats 2-3 servings of fruits and vegetables daily.She consumes 0 sweetened beverage(s) daily.She exercises with enough effort to increase her heart rate 30 to 60 minutes per day.  She exercises with enough effort to increase her heart rate 5 days per week.   She is taking " "medications regularly.          Constant dizziness beginning 7/14, she was seen at [facility] that day and given fluids and antivert prn which helped. She returned home and did do half summersault manouver and then slept for about 19 hours. When she woke sx were totally resolved. She notes that when sx were present she was having a lot of nausea, emesis and a whirling feeling when moving head. No LOC/falls. No chest pain, palpitations, weakness, vision changes, bleeding history. She notes that she does have constant ringing in her ears.She notes that she has had episodes of dizziness in the past but never this bad.          Review of Systems  Constitutional, HEENT, cardiovascular, pulmonary, gi and gu systems are negative, except as otherwise noted.      Objective    /74   Pulse 68   Temp 97.3  F (36.3  C) (Tympanic)   Resp 16   Ht 1.638 m (5' 4.5\")   Wt 69.9 kg (154 lb 2 oz)   SpO2 99%   BMI 26.05 kg/m    Body mass index is 26.05 kg/m .  Physical Exam   GENERAL: alert and no distress  NECK: no adenopathy, no asymmetry, masses, or scars  RESP: lungs clear to auscultation - no rales, rhonchi or wheezes  CV: regular rate and rhythm, normal S1 S2, no S3 or S4, no murmur, click or rub, no peripheral edema  MS: no gross musculoskeletal defects noted, no edema      ADS labs/imaging reviewed        Signed Electronically by: Manisha Han MD    "

## 2025-07-22 NOTE — TELEPHONE ENCOUNTER
RN checked with covering provider, Ivania Hemphill, and she recommends lab follow up for lipids 3 months after starting medication. Lab has been ordered and patient will schedule via [facility].    Cindy Flanagan RN on 7/22/2025 at 3:30 PM